# Patient Record
Sex: MALE | Race: WHITE | Employment: OTHER | URBAN - METROPOLITAN AREA
[De-identification: names, ages, dates, MRNs, and addresses within clinical notes are randomized per-mention and may not be internally consistent; named-entity substitution may affect disease eponyms.]

---

## 2017-01-01 ENCOUNTER — APPOINTMENT (OUTPATIENT)
Dept: GENERAL RADIOLOGY | Facility: CLINIC | Age: 82
DRG: 871 | End: 2017-01-01
Attending: NURSE PRACTITIONER
Payer: MEDICARE

## 2017-01-01 ENCOUNTER — RECORDS - HEALTHEAST (OUTPATIENT)
Dept: LAB | Facility: CLINIC | Age: 82
End: 2017-01-01

## 2017-01-01 ENCOUNTER — MEDICAL CORRESPONDENCE (OUTPATIENT)
Dept: INTENSIVE CARE | Facility: CLINIC | Age: 82
End: 2017-01-01

## 2017-01-01 ENCOUNTER — HOSPITAL ENCOUNTER (INPATIENT)
Facility: CLINIC | Age: 82
LOS: 6 days | Discharge: HOSPICE | DRG: 871 | End: 2017-08-04
Attending: NURSE PRACTITIONER | Admitting: HOSPITALIST
Payer: MEDICARE

## 2017-01-01 ENCOUNTER — APPOINTMENT (OUTPATIENT)
Dept: SPEECH THERAPY | Facility: CLINIC | Age: 82
DRG: 871 | End: 2017-01-01
Attending: HOSPITALIST
Payer: MEDICARE

## 2017-01-01 VITALS
TEMPERATURE: 97.9 F | SYSTOLIC BLOOD PRESSURE: 117 MMHG | OXYGEN SATURATION: 94 % | HEIGHT: 66 IN | WEIGHT: 164.24 LBS | RESPIRATION RATE: 16 BRPM | HEART RATE: 72 BPM | BODY MASS INDEX: 26.4 KG/M2 | DIASTOLIC BLOOD PRESSURE: 71 MMHG

## 2017-01-01 DIAGNOSIS — Z51.5 END OF LIFE CARE: Primary | ICD-10-CM

## 2017-01-01 DIAGNOSIS — D62 ANEMIA DUE TO BLOOD LOSS, ACUTE: ICD-10-CM

## 2017-01-01 DIAGNOSIS — J18.9 HCAP (HEALTHCARE-ASSOCIATED PNEUMONIA): ICD-10-CM

## 2017-01-01 DIAGNOSIS — R19.5 GUAIAC + STOOL: ICD-10-CM

## 2017-01-01 DIAGNOSIS — R09.02 HYPOXIA: ICD-10-CM

## 2017-01-01 LAB
ABO + RH BLD: NORMAL
ABO + RH BLD: NORMAL
ALBUMIN SERPL-MCNC: 3 G/DL (ref 3.4–5)
ALBUMIN SERPL-MCNC: 3.5 G/DL (ref 3.4–5)
ALBUMIN UR-MCNC: NEGATIVE MG/DL
ALP SERPL-CCNC: 45 U/L (ref 40–150)
ALP SERPL-CCNC: 59 U/L (ref 40–150)
ALT SERPL W P-5'-P-CCNC: 15 U/L (ref 0–70)
ALT SERPL W P-5'-P-CCNC: 15 U/L (ref 0–70)
ANION GAP SERPL CALCULATED.3IONS-SCNC: 11 MMOL/L (ref 3–14)
ANION GAP SERPL CALCULATED.3IONS-SCNC: 11 MMOL/L (ref 3–14)
ANION GAP SERPL CALCULATED.3IONS-SCNC: 5 MMOL/L (ref 3–14)
ANION GAP SERPL CALCULATED.3IONS-SCNC: 8 MMOL/L (ref 3–14)
ANISOCYTOSIS BLD QL SMEAR: SLIGHT
ANISOCYTOSIS BLD QL SMEAR: SLIGHT
APPEARANCE UR: CLEAR
AST SERPL W P-5'-P-CCNC: 16 U/L (ref 0–45)
AST SERPL W P-5'-P-CCNC: 20 U/L (ref 0–45)
BACTERIA SPEC CULT: NO GROWTH
BACTERIA SPEC CULT: NO GROWTH
BASOPHILS # BLD AUTO: 0 10E9/L (ref 0–0.2)
BASOPHILS # BLD AUTO: 0 10E9/L (ref 0–0.2)
BASOPHILS NFR BLD AUTO: 0 %
BASOPHILS NFR BLD AUTO: 0.3 %
BILIRUB DIRECT SERPL-MCNC: 0.2 MG/DL (ref 0–0.2)
BILIRUB SERPL-MCNC: 0.4 MG/DL (ref 0.2–1.3)
BILIRUB SERPL-MCNC: 0.6 MG/DL (ref 0.2–1.3)
BILIRUB UR QL STRIP: NEGATIVE
BLD GP AB SCN SERPL QL: NORMAL
BLD PROD TYP BPU: NORMAL
BLD UNIT ID BPU: 0
BLOOD BANK CMNT PATIENT-IMP: NORMAL
BLOOD PRODUCT CODE: NORMAL
BPU ID: NORMAL
BUN SERPL-MCNC: 16 MG/DL (ref 7–30)
BUN SERPL-MCNC: 17 MG/DL (ref 7–30)
BUN SERPL-MCNC: 20 MG/DL (ref 7–30)
BUN SERPL-MCNC: 21 MG/DL (ref 7–30)
CALCIUM SERPL-MCNC: 7.5 MG/DL (ref 8.5–10.1)
CALCIUM SERPL-MCNC: 7.6 MG/DL (ref 8.5–10.1)
CALCIUM SERPL-MCNC: 7.6 MG/DL (ref 8.5–10.1)
CALCIUM SERPL-MCNC: 8.6 MG/DL (ref 8.5–10.1)
CHLORIDE SERPL-SCNC: 103 MMOL/L (ref 94–109)
CHLORIDE SERPL-SCNC: 106 MMOL/L (ref 94–109)
CHLORIDE SERPL-SCNC: 108 MMOL/L (ref 94–109)
CHLORIDE SERPL-SCNC: 110 MMOL/L (ref 94–109)
CO2 SERPL-SCNC: 21 MMOL/L (ref 20–32)
CO2 SERPL-SCNC: 22 MMOL/L (ref 20–32)
CO2 SERPL-SCNC: 22 MMOL/L (ref 20–32)
CO2 SERPL-SCNC: 25 MMOL/L (ref 20–32)
COLOR UR AUTO: YELLOW
CREAT SERPL-MCNC: 0.58 MG/DL (ref 0.66–1.25)
CREAT SERPL-MCNC: 0.58 MG/DL (ref 0.66–1.25)
CREAT SERPL-MCNC: 0.67 MG/DL (ref 0.66–1.25)
CREAT SERPL-MCNC: 0.76 MG/DL (ref 0.66–1.25)
DACRYOCYTES BLD QL SMEAR: SLIGHT
DIFFERENTIAL METHOD BLD: ABNORMAL
DIFFERENTIAL METHOD BLD: ABNORMAL
EOSINOPHIL # BLD AUTO: 0 10E9/L (ref 0–0.7)
EOSINOPHIL # BLD AUTO: 0.1 10E9/L (ref 0–0.7)
EOSINOPHIL NFR BLD AUTO: 0 %
EOSINOPHIL NFR BLD AUTO: 0.6 %
ERYTHROCYTE [DISTWIDTH] IN BLOOD BY AUTOMATED COUNT: 17.4 % (ref 10–15)
ERYTHROCYTE [DISTWIDTH] IN BLOOD BY AUTOMATED COUNT: 17.4 % (ref 10–15)
ERYTHROCYTE [DISTWIDTH] IN BLOOD BY AUTOMATED COUNT: 17.5 % (ref 10–15)
ERYTHROCYTE [DISTWIDTH] IN BLOOD BY AUTOMATED COUNT: 17.9 % (ref 10–15)
ERYTHROCYTE [DISTWIDTH] IN BLOOD BY AUTOMATED COUNT: 18 % (ref 10–15)
ERYTHROCYTE [DISTWIDTH] IN BLOOD BY AUTOMATED COUNT: 18.3 % (ref 10–15)
GFR SERPL CREATININE-BSD FRML MDRD: ABNORMAL ML/MIN/1.7M2
GLUCOSE BLDC GLUCOMTR-MCNC: 100 MG/DL (ref 70–99)
GLUCOSE BLDC GLUCOMTR-MCNC: 100 MG/DL (ref 70–99)
GLUCOSE BLDC GLUCOMTR-MCNC: 105 MG/DL (ref 70–99)
GLUCOSE BLDC GLUCOMTR-MCNC: 136 MG/DL (ref 70–99)
GLUCOSE BLDC GLUCOMTR-MCNC: 137 MG/DL (ref 70–99)
GLUCOSE BLDC GLUCOMTR-MCNC: 155 MG/DL (ref 70–99)
GLUCOSE BLDC GLUCOMTR-MCNC: 161 MG/DL (ref 70–99)
GLUCOSE BLDC GLUCOMTR-MCNC: 162 MG/DL (ref 70–99)
GLUCOSE BLDC GLUCOMTR-MCNC: 65 MG/DL (ref 70–99)
GLUCOSE BLDC GLUCOMTR-MCNC: 68 MG/DL (ref 70–99)
GLUCOSE BLDC GLUCOMTR-MCNC: 70 MG/DL (ref 70–99)
GLUCOSE BLDC GLUCOMTR-MCNC: 72 MG/DL (ref 70–99)
GLUCOSE BLDC GLUCOMTR-MCNC: 74 MG/DL (ref 70–99)
GLUCOSE BLDC GLUCOMTR-MCNC: 74 MG/DL (ref 70–99)
GLUCOSE BLDC GLUCOMTR-MCNC: 75 MG/DL (ref 70–99)
GLUCOSE BLDC GLUCOMTR-MCNC: 76 MG/DL (ref 70–99)
GLUCOSE BLDC GLUCOMTR-MCNC: 77 MG/DL (ref 70–99)
GLUCOSE BLDC GLUCOMTR-MCNC: 80 MG/DL (ref 70–99)
GLUCOSE BLDC GLUCOMTR-MCNC: 81 MG/DL (ref 70–99)
GLUCOSE BLDC GLUCOMTR-MCNC: 83 MG/DL (ref 70–99)
GLUCOSE BLDC GLUCOMTR-MCNC: 83 MG/DL (ref 70–99)
GLUCOSE BLDC GLUCOMTR-MCNC: 88 MG/DL (ref 70–99)
GLUCOSE BLDC GLUCOMTR-MCNC: 89 MG/DL (ref 70–99)
GLUCOSE BLDC GLUCOMTR-MCNC: 90 MG/DL (ref 70–99)
GLUCOSE SERPL-MCNC: 157 MG/DL (ref 70–99)
GLUCOSE SERPL-MCNC: 176 MG/DL (ref 70–99)
GLUCOSE SERPL-MCNC: 83 MG/DL (ref 70–99)
GLUCOSE SERPL-MCNC: 85 MG/DL (ref 70–99)
GLUCOSE UR STRIP-MCNC: NEGATIVE MG/DL
HBA1C MFR BLD: 11.6 % (ref 4.2–6.1)
HBA1C MFR BLD: 5 % (ref 4.3–6)
HBA1C MFR BLD: 5.4 % (ref 4.2–6.1)
HCT VFR BLD AUTO: 22.5 % (ref 40–53)
HCT VFR BLD AUTO: 24.2 % (ref 40–53)
HCT VFR BLD AUTO: 25.5 % (ref 40–53)
HCT VFR BLD AUTO: 26.7 % (ref 40–53)
HCT VFR BLD AUTO: 26.8 % (ref 40–53)
HCT VFR BLD AUTO: 28 % (ref 40–53)
HEMOCCULT STL QL: POSITIVE
HGB BLD-MCNC: 6.2 G/DL (ref 13.3–17.7)
HGB BLD-MCNC: 6.7 G/DL (ref 13.3–17.7)
HGB BLD-MCNC: 6.9 G/DL (ref 13.3–17.7)
HGB BLD-MCNC: 7.9 G/DL (ref 13.3–17.7)
HGB BLD-MCNC: 8.1 G/DL (ref 13.3–17.7)
HGB BLD-MCNC: 8.1 G/DL (ref 13.3–17.7)
HGB BLD-MCNC: 8.2 G/DL (ref 13.3–17.7)
HGB BLD-MCNC: 8.7 G/DL (ref 13.3–17.7)
HGB UR QL STRIP: ABNORMAL
IMM GRANULOCYTES # BLD: 0.1 10E9/L (ref 0–0.4)
IMM GRANULOCYTES NFR BLD: 0.4 %
INR PPP: 1.3 (ref 0.86–1.14)
INTERPRETATION ECG - MUSE: NORMAL
KETONES UR STRIP-MCNC: 20 MG/DL
LACTATE BLD-SCNC: 1.8 MMOL/L (ref 0.7–2.1)
LACTATE BLD-SCNC: 2.9 MMOL/L (ref 0.7–2.1)
LEUKOCYTE ESTERASE UR QL STRIP: NEGATIVE
LYMPHOCYTES # BLD AUTO: 0.8 10E9/L (ref 0.8–5.3)
LYMPHOCYTES # BLD AUTO: 1.7 10E9/L (ref 0.8–5.3)
LYMPHOCYTES NFR BLD AUTO: 13.1 %
LYMPHOCYTES NFR BLD AUTO: 9 %
Lab: NORMAL
MAGNESIUM SERPL-MCNC: 1.9 MG/DL (ref 1.6–2.3)
MAGNESIUM SERPL-MCNC: 2.5 MG/DL (ref 1.6–2.3)
MCH RBC QN AUTO: 21.4 PG (ref 26.5–33)
MCH RBC QN AUTO: 24.4 PG (ref 26.5–33)
MCH RBC QN AUTO: 24.7 PG (ref 26.5–33)
MCH RBC QN AUTO: 24.8 PG (ref 26.5–33)
MCH RBC QN AUTO: 24.8 PG (ref 26.5–33)
MCH RBC QN AUTO: 25.3 PG (ref 26.5–33)
MCHC RBC AUTO-ENTMCNC: 27.7 G/DL (ref 31.5–36.5)
MCHC RBC AUTO-ENTMCNC: 30.2 G/DL (ref 31.5–36.5)
MCHC RBC AUTO-ENTMCNC: 30.3 G/DL (ref 31.5–36.5)
MCHC RBC AUTO-ENTMCNC: 30.7 G/DL (ref 31.5–36.5)
MCHC RBC AUTO-ENTMCNC: 31 G/DL (ref 31.5–36.5)
MCHC RBC AUTO-ENTMCNC: 31.1 G/DL (ref 31.5–36.5)
MCV RBC AUTO: 77 FL (ref 78–100)
MCV RBC AUTO: 80 FL (ref 78–100)
MCV RBC AUTO: 80 FL (ref 78–100)
MCV RBC AUTO: 82 FL (ref 78–100)
MICRO REPORT STATUS: NORMAL
MICRO REPORT STATUS: NORMAL
MICROCYTES BLD QL SMEAR: PRESENT
MONOCYTES # BLD AUTO: 0.1 10E9/L (ref 0–1.3)
MONOCYTES # BLD AUTO: 0.4 10E9/L (ref 0–1.3)
MONOCYTES NFR BLD AUTO: 1 %
MONOCYTES NFR BLD AUTO: 3.4 %
MRSA DNA SPEC QL NAA+PROBE: NORMAL
MUCOUS THREADS #/AREA URNS LPF: PRESENT /LPF
NEUTROPHILS # BLD AUTO: 10.5 10E9/L (ref 1.6–8.3)
NEUTROPHILS # BLD AUTO: 7.7 10E9/L (ref 1.6–8.3)
NEUTROPHILS NFR BLD AUTO: 82.2 %
NEUTROPHILS NFR BLD AUTO: 90 %
NITRATE UR QL: NEGATIVE
NRBC # BLD AUTO: 0 10*3/UL
NRBC BLD AUTO-RTO: 0 /100
NUM BPU REQUESTED: 3
OVALOCYTES BLD QL SMEAR: SLIGHT
PH UR STRIP: 5 PH (ref 5–7)
PLATELET # BLD AUTO: 248 10E9/L (ref 150–450)
PLATELET # BLD AUTO: 254 10E9/L (ref 150–450)
PLATELET # BLD AUTO: 258 10E9/L (ref 150–450)
PLATELET # BLD AUTO: 259 10E9/L (ref 150–450)
PLATELET # BLD AUTO: 326 10E9/L (ref 150–450)
PLATELET # BLD AUTO: 548 10E9/L (ref 150–450)
PLATELET # BLD EST: ABNORMAL 10*3/UL
PLATELET # BLD EST: NORMAL 10*3/UL
POTASSIUM SERPL-SCNC: 3.6 MMOL/L (ref 3.4–5.3)
POTASSIUM SERPL-SCNC: 3.7 MMOL/L (ref 3.4–5.3)
POTASSIUM SERPL-SCNC: 3.8 MMOL/L (ref 3.4–5.3)
POTASSIUM SERPL-SCNC: 4.4 MMOL/L (ref 3.4–5.3)
PROT SERPL-MCNC: 6.6 G/DL (ref 6.8–8.8)
PROT SERPL-MCNC: 7.6 G/DL (ref 6.8–8.8)
RBC # BLD AUTO: 2.83 10E12/L (ref 4.4–5.9)
RBC # BLD AUTO: 3.12 10E12/L (ref 4.4–5.9)
RBC # BLD AUTO: 3.13 10E12/L (ref 4.4–5.9)
RBC # BLD AUTO: 3.26 10E12/L (ref 4.4–5.9)
RBC # BLD AUTO: 3.27 10E12/L (ref 4.4–5.9)
RBC # BLD AUTO: 3.52 10E12/L (ref 4.4–5.9)
RBC #/AREA URNS AUTO: 18 /HPF (ref 0–2)
SODIUM SERPL-SCNC: 135 MMOL/L (ref 133–144)
SODIUM SERPL-SCNC: 138 MMOL/L (ref 133–144)
SODIUM SERPL-SCNC: 139 MMOL/L (ref 133–144)
SODIUM SERPL-SCNC: 140 MMOL/L (ref 133–144)
SP GR UR STRIP: 1.01 (ref 1–1.03)
SPECIMEN EXP DATE BLD: NORMAL
SPECIMEN SOURCE: NORMAL
TRANSFUSION STATUS PATIENT QL: NORMAL
TROPONIN I SERPL-MCNC: 0.01 UG/L (ref 0–0.04)
URN SPEC COLLECT METH UR: ABNORMAL
UROBILINOGEN UR STRIP-MCNC: 0 MG/DL (ref 0–2)
WBC # BLD AUTO: 12.8 10E9/L (ref 4–11)
WBC # BLD AUTO: 7.1 10E9/L (ref 4–11)
WBC # BLD AUTO: 7.2 10E9/L (ref 4–11)
WBC # BLD AUTO: 7.5 10E9/L (ref 4–11)
WBC # BLD AUTO: 7.7 10E9/L (ref 4–11)
WBC # BLD AUTO: 8.6 10E9/L (ref 4–11)
WBC #/AREA URNS AUTO: <1 /HPF (ref 0–2)

## 2017-01-01 PROCEDURE — 86850 RBC ANTIBODY SCREEN: CPT | Performed by: NURSE PRACTITIONER

## 2017-01-01 PROCEDURE — 25000128 H RX IP 250 OP 636: Performed by: NURSE PRACTITIONER

## 2017-01-01 PROCEDURE — 12000011 ZZH R&B MS OVERFLOW

## 2017-01-01 PROCEDURE — 83735 ASSAY OF MAGNESIUM: CPT | Performed by: INTERNAL MEDICINE

## 2017-01-01 PROCEDURE — 00000146 ZZHCL STATISTIC GLUCOSE BY METER IP

## 2017-01-01 PROCEDURE — 25000128 H RX IP 250 OP 636: Performed by: HOSPITALIST

## 2017-01-01 PROCEDURE — 86900 BLOOD TYPING SEROLOGIC ABO: CPT | Performed by: NURSE PRACTITIONER

## 2017-01-01 PROCEDURE — 82272 OCCULT BLD FECES 1-3 TESTS: CPT | Performed by: NURSE PRACTITIONER

## 2017-01-01 PROCEDURE — 40000275 ZZH STATISTIC RCP TIME EA 10 MIN

## 2017-01-01 PROCEDURE — 25000128 H RX IP 250 OP 636: Performed by: INTERNAL MEDICINE

## 2017-01-01 PROCEDURE — 25800025 ZZH RX 258: Performed by: INTERNAL MEDICINE

## 2017-01-01 PROCEDURE — 99232 SBSQ HOSP IP/OBS MODERATE 35: CPT | Performed by: INTERNAL MEDICINE

## 2017-01-01 PROCEDURE — A9270 NON-COVERED ITEM OR SERVICE: HCPCS | Mod: GY | Performed by: CLINICAL NURSE SPECIALIST

## 2017-01-01 PROCEDURE — 25000125 ZZHC RX 250: Performed by: HOSPITALIST

## 2017-01-01 PROCEDURE — 31720 CLEARANCE OF AIRWAYS: CPT

## 2017-01-01 PROCEDURE — 27210300 ZZH CANNULA HIGH FLOW, ADULT

## 2017-01-01 PROCEDURE — 40000983 ZZH STATISTIC HFNC ADULT NON-CPAP

## 2017-01-01 PROCEDURE — 25000125 ZZHC RX 250: Performed by: INTERNAL MEDICINE

## 2017-01-01 PROCEDURE — 25000125 ZZHC RX 250: Performed by: CLINICAL NURSE SPECIALIST

## 2017-01-01 PROCEDURE — 36415 COLL VENOUS BLD VENIPUNCTURE: CPT | Performed by: INTERNAL MEDICINE

## 2017-01-01 PROCEDURE — 83605 ASSAY OF LACTIC ACID: CPT | Performed by: HOSPITALIST

## 2017-01-01 PROCEDURE — 85025 COMPLETE CBC W/AUTO DIFF WBC: CPT | Performed by: NURSE PRACTITIONER

## 2017-01-01 PROCEDURE — S0077 INJECTION, CLINDAMYCIN PHOSP: HCPCS | Performed by: HOSPITALIST

## 2017-01-01 PROCEDURE — 99233 SBSQ HOSP IP/OBS HIGH 50: CPT | Performed by: INTERNAL MEDICINE

## 2017-01-01 PROCEDURE — 81001 URINALYSIS AUTO W/SCOPE: CPT | Performed by: NURSE PRACTITIONER

## 2017-01-01 PROCEDURE — 99207 ZZC CDG-MDM COMPONENT: MEETS LOW - DOWN CODED: CPT | Performed by: INTERNAL MEDICINE

## 2017-01-01 PROCEDURE — 85018 HEMOGLOBIN: CPT | Performed by: NURSE PRACTITIONER

## 2017-01-01 PROCEDURE — 25000128 H RX IP 250 OP 636: Performed by: CLINICAL NURSE SPECIALIST

## 2017-01-01 PROCEDURE — 99239 HOSP IP/OBS DSCHRG MGMT >30: CPT | Performed by: HOSPITALIST

## 2017-01-01 PROCEDURE — 25000132 ZZH RX MED GY IP 250 OP 250 PS 637: Mod: GY | Performed by: CLINICAL NURSE SPECIALIST

## 2017-01-01 PROCEDURE — 85027 COMPLETE CBC AUTOMATED: CPT | Performed by: INTERNAL MEDICINE

## 2017-01-01 PROCEDURE — 25000131 ZZH RX MED GY IP 250 OP 636 PS 637: Mod: GY | Performed by: INTERNAL MEDICINE

## 2017-01-01 PROCEDURE — 80053 COMPREHEN METABOLIC PANEL: CPT | Performed by: NURSE PRACTITIONER

## 2017-01-01 PROCEDURE — 99232 SBSQ HOSP IP/OBS MODERATE 35: CPT | Performed by: HOSPITALIST

## 2017-01-01 PROCEDURE — 25000125 ZZHC RX 250: Performed by: NURSE PRACTITIONER

## 2017-01-01 PROCEDURE — 93005 ELECTROCARDIOGRAM TRACING: CPT

## 2017-01-01 PROCEDURE — 80048 BASIC METABOLIC PNL TOTAL CA: CPT | Performed by: INTERNAL MEDICINE

## 2017-01-01 PROCEDURE — 36415 COLL VENOUS BLD VENIPUNCTURE: CPT | Performed by: NURSE PRACTITIONER

## 2017-01-01 PROCEDURE — 71020 XR CHEST 2 VW: CPT

## 2017-01-01 PROCEDURE — P9016 RBC LEUKOCYTES REDUCED: HCPCS | Performed by: NURSE PRACTITIONER

## 2017-01-01 PROCEDURE — 99285 EMERGENCY DEPT VISIT HI MDM: CPT | Mod: 25

## 2017-01-01 PROCEDURE — 94660 CPAP INITIATION&MGMT: CPT

## 2017-01-01 PROCEDURE — 92610 EVALUATE SWALLOWING FUNCTION: CPT | Mod: GN | Performed by: SPEECH-LANGUAGE PATHOLOGIST

## 2017-01-01 PROCEDURE — 85018 HEMOGLOBIN: CPT | Performed by: INTERNAL MEDICINE

## 2017-01-01 PROCEDURE — 99223 1ST HOSP IP/OBS HIGH 75: CPT | Mod: AI | Performed by: HOSPITALIST

## 2017-01-01 PROCEDURE — 92507 TX SP LANG VOICE COMM INDIV: CPT | Mod: GN | Performed by: SPEECH-LANGUAGE PATHOLOGIST

## 2017-01-01 PROCEDURE — 86923 COMPATIBILITY TEST ELECTRIC: CPT | Performed by: NURSE PRACTITIONER

## 2017-01-01 PROCEDURE — 87040 BLOOD CULTURE FOR BACTERIA: CPT | Performed by: INTERNAL MEDICINE

## 2017-01-01 PROCEDURE — 20000003 ZZH R&B ICU

## 2017-01-01 PROCEDURE — 99233 SBSQ HOSP IP/OBS HIGH 50: CPT | Performed by: CLINICAL NURSE SPECIALIST

## 2017-01-01 PROCEDURE — 85610 PROTHROMBIN TIME: CPT | Performed by: INTERNAL MEDICINE

## 2017-01-01 PROCEDURE — 84484 ASSAY OF TROPONIN QUANT: CPT | Performed by: NURSE PRACTITIONER

## 2017-01-01 PROCEDURE — 99223 1ST HOSP IP/OBS HIGH 75: CPT | Performed by: CLINICAL NURSE SPECIALIST

## 2017-01-01 PROCEDURE — 36415 COLL VENOUS BLD VENIPUNCTURE: CPT | Performed by: HOSPITALIST

## 2017-01-01 PROCEDURE — 96361 HYDRATE IV INFUSION ADD-ON: CPT

## 2017-01-01 PROCEDURE — 99207 ZZC CDG-MDM COMPONENT: MEETS LOW - DOWN CODED: CPT | Performed by: HOSPITALIST

## 2017-01-01 PROCEDURE — 85025 COMPLETE CBC W/AUTO DIFF WBC: CPT | Performed by: HOSPITALIST

## 2017-01-01 PROCEDURE — 40000281 ZZH STATISTIC TRANSPORT TIME EA 15 MIN

## 2017-01-01 PROCEDURE — 80076 HEPATIC FUNCTION PANEL: CPT | Performed by: HOSPITALIST

## 2017-01-01 PROCEDURE — 40000225 ZZH STATISTIC SLP WARD VISIT: Performed by: SPEECH-LANGUAGE PATHOLOGIST

## 2017-01-01 PROCEDURE — 80048 BASIC METABOLIC PNL TOTAL CA: CPT | Performed by: HOSPITALIST

## 2017-01-01 PROCEDURE — 96365 THER/PROPH/DIAG IV INF INIT: CPT

## 2017-01-01 PROCEDURE — 25000132 ZZH RX MED GY IP 250 OP 250 PS 637: Mod: GY | Performed by: INTERNAL MEDICINE

## 2017-01-01 PROCEDURE — 96366 THER/PROPH/DIAG IV INF ADDON: CPT

## 2017-01-01 PROCEDURE — 83036 HEMOGLOBIN GLYCOSYLATED A1C: CPT | Performed by: HOSPITALIST

## 2017-01-01 PROCEDURE — 83605 ASSAY OF LACTIC ACID: CPT | Performed by: NURSE PRACTITIONER

## 2017-01-01 PROCEDURE — 86901 BLOOD TYPING SEROLOGIC RH(D): CPT | Performed by: NURSE PRACTITIONER

## 2017-01-01 PROCEDURE — 87641 MR-STAPH DNA AMP PROBE: CPT | Performed by: INTERNAL MEDICINE

## 2017-01-01 PROCEDURE — 83735 ASSAY OF MAGNESIUM: CPT | Performed by: HOSPITALIST

## 2017-01-01 PROCEDURE — 87640 STAPH A DNA AMP PROBE: CPT | Performed by: INTERNAL MEDICINE

## 2017-01-01 RX ORDER — DIVALPROEX SODIUM 250 MG/1
250 TABLET, EXTENDED RELEASE ORAL AT BEDTIME
Status: ON HOLD | COMMUNITY
End: 2017-01-01

## 2017-01-01 RX ORDER — POTASSIUM CHLORIDE 1500 MG/1
20-40 TABLET, EXTENDED RELEASE ORAL
Status: DISCONTINUED | OUTPATIENT
Start: 2017-01-01 | End: 2017-01-01

## 2017-01-01 RX ORDER — POTASSIUM CHLORIDE 1.5 G/1.58G
20-40 POWDER, FOR SOLUTION ORAL
Status: DISCONTINUED | OUTPATIENT
Start: 2017-01-01 | End: 2017-01-01

## 2017-01-01 RX ORDER — MORPHINE SULFATE 20 MG/ML
5-10 SOLUTION ORAL
Qty: 15 ML | Refills: 0 | Status: SHIPPED | OUTPATIENT
Start: 2017-01-01

## 2017-01-01 RX ORDER — MORPHINE SULFATE 2 MG/ML
1-2 INJECTION, SOLUTION INTRAMUSCULAR; INTRAVENOUS
Status: DISCONTINUED | OUTPATIENT
Start: 2017-01-01 | End: 2017-01-01

## 2017-01-01 RX ORDER — LORAZEPAM 2 MG/ML
.5-1 INJECTION INTRAMUSCULAR
Status: DISCONTINUED | OUTPATIENT
Start: 2017-01-01 | End: 2017-01-01 | Stop reason: HOSPADM

## 2017-01-01 RX ORDER — HALOPERIDOL 2 MG/ML
.5-1 SOLUTION ORAL EVERY 6 HOURS PRN
Qty: 15 ML | Refills: 0 | Status: SHIPPED | OUTPATIENT
Start: 2017-01-01

## 2017-01-01 RX ORDER — SODIUM CHLORIDE 9 MG/ML
INJECTION, SOLUTION INTRAVENOUS CONTINUOUS
Status: DISCONTINUED | OUTPATIENT
Start: 2017-01-01 | End: 2017-01-01

## 2017-01-01 RX ORDER — PANTOPRAZOLE SODIUM 40 MG/1
40 TABLET, DELAYED RELEASE ORAL DAILY
Status: DISCONTINUED | OUTPATIENT
Start: 2017-01-01 | End: 2017-01-01

## 2017-01-01 RX ORDER — POTASSIUM CHLORIDE 7.45 MG/ML
10 INJECTION INTRAVENOUS
Status: DISCONTINUED | OUTPATIENT
Start: 2017-01-01 | End: 2017-01-01

## 2017-01-01 RX ORDER — POTASSIUM CHLORIDE 29.8 MG/ML
20 INJECTION INTRAVENOUS
Status: DISCONTINUED | OUTPATIENT
Start: 2017-01-01 | End: 2017-01-01

## 2017-01-01 RX ORDER — ASPIRIN 81 MG/1
81 TABLET, CHEWABLE ORAL DAILY
Status: DISCONTINUED | OUTPATIENT
Start: 2017-01-01 | End: 2017-01-01

## 2017-01-01 RX ORDER — MIRTAZAPINE 15 MG/1
15 TABLET, ORALLY DISINTEGRATING ORAL AT BEDTIME
Status: DISCONTINUED | OUTPATIENT
Start: 2017-01-01 | End: 2017-01-01

## 2017-01-01 RX ORDER — BISACODYL 10 MG
10 SUPPOSITORY, RECTAL RECTAL DAILY PRN
Status: DISCONTINUED | OUTPATIENT
Start: 2017-01-01 | End: 2017-01-01 | Stop reason: HOSPADM

## 2017-01-01 RX ORDER — LORAZEPAM 0.5 MG/1
.5-1 TABLET ORAL
Status: DISCONTINUED | OUTPATIENT
Start: 2017-01-01 | End: 2017-01-01 | Stop reason: HOSPADM

## 2017-01-01 RX ORDER — SERTRALINE HYDROCHLORIDE 25 MG/1
25 TABLET, FILM COATED ORAL DAILY
Status: DISCONTINUED | OUTPATIENT
Start: 2017-01-01 | End: 2017-01-01

## 2017-01-01 RX ORDER — LORAZEPAM 0.5 MG/1
.5-1 TABLET ORAL
Status: DISCONTINUED | OUTPATIENT
Start: 2017-01-01 | End: 2017-01-01

## 2017-01-01 RX ORDER — SCOLOPAMINE TRANSDERMAL SYSTEM 1 MG/1
1 PATCH, EXTENDED RELEASE TRANSDERMAL
Status: DISCONTINUED | OUTPATIENT
Start: 2017-01-01 | End: 2017-01-01

## 2017-01-01 RX ORDER — MORPHINE SULFATE 10 MG/5ML
5-10 SOLUTION ORAL
Status: DISCONTINUED | OUTPATIENT
Start: 2017-01-01 | End: 2017-01-01

## 2017-01-01 RX ORDER — TIMOLOL MALEATE 2.5 MG/ML
1 SOLUTION/ DROPS OPHTHALMIC EVERY MORNING
Status: DISCONTINUED | OUTPATIENT
Start: 2017-01-01 | End: 2017-01-01 | Stop reason: CLARIF

## 2017-01-01 RX ORDER — METHOCARBAMOL 750 MG/1
750 TABLET, FILM COATED ORAL 3 TIMES DAILY
Status: DISCONTINUED | OUTPATIENT
Start: 2017-01-01 | End: 2017-01-01

## 2017-01-01 RX ORDER — LORAZEPAM 2 MG/ML
.25-.5 CONCENTRATE ORAL EVERY 4 HOURS PRN
Qty: 30 ML | Refills: 0 | Status: SHIPPED | OUTPATIENT
Start: 2017-01-01

## 2017-01-01 RX ORDER — POLYETHYLENE GLYCOL 3350 17 G/17G
17 POWDER, FOR SOLUTION ORAL DAILY PRN
Status: DISCONTINUED | OUTPATIENT
Start: 2017-01-01 | End: 2017-01-01 | Stop reason: CLARIF

## 2017-01-01 RX ORDER — AMOXICILLIN 250 MG
1-2 CAPSULE ORAL 2 TIMES DAILY
Status: DISCONTINUED | OUTPATIENT
Start: 2017-01-01 | End: 2017-01-01

## 2017-01-01 RX ORDER — TIMOLOL MALEATE 5 MG/ML
1 SOLUTION/ DROPS OPHTHALMIC 2 TIMES DAILY
COMMUNITY

## 2017-01-01 RX ORDER — ACETAMINOPHEN 500 MG
1000 TABLET ORAL 3 TIMES DAILY
Status: DISCONTINUED | OUTPATIENT
Start: 2017-01-01 | End: 2017-01-01

## 2017-01-01 RX ORDER — NICOTINE POLACRILEX 4 MG
15-30 LOZENGE BUCCAL
Status: DISCONTINUED | OUTPATIENT
Start: 2017-01-01 | End: 2017-01-01 | Stop reason: HOSPADM

## 2017-01-01 RX ORDER — GLYCOPYRROLATE 0.2 MG/ML
.2-.4 INJECTION, SOLUTION INTRAMUSCULAR; INTRAVENOUS EVERY 4 HOURS PRN
Status: DISCONTINUED | OUTPATIENT
Start: 2017-01-01 | End: 2017-01-01

## 2017-01-01 RX ORDER — ONDANSETRON 2 MG/ML
4 INJECTION INTRAMUSCULAR; INTRAVENOUS EVERY 6 HOURS PRN
Status: DISCONTINUED | OUTPATIENT
Start: 2017-01-01 | End: 2017-01-01

## 2017-01-01 RX ORDER — NALOXONE HYDROCHLORIDE 0.4 MG/ML
.1-.4 INJECTION, SOLUTION INTRAMUSCULAR; INTRAVENOUS; SUBCUTANEOUS
Status: DISCONTINUED | OUTPATIENT
Start: 2017-01-01 | End: 2017-01-01 | Stop reason: HOSPADM

## 2017-01-01 RX ORDER — LATANOPROST 50 UG/ML
1 SOLUTION/ DROPS OPHTHALMIC AT BEDTIME
COMMUNITY

## 2017-01-01 RX ORDER — QUETIAPINE FUMARATE 25 MG/1
25 TABLET, FILM COATED ORAL 2 TIMES DAILY PRN
Status: DISCONTINUED | OUTPATIENT
Start: 2017-01-01 | End: 2017-01-01 | Stop reason: CLARIF

## 2017-01-01 RX ORDER — DEXTROSE MONOHYDRATE 25 G/50ML
25-50 INJECTION, SOLUTION INTRAVENOUS
Status: DISCONTINUED | OUTPATIENT
Start: 2017-01-01 | End: 2017-01-01 | Stop reason: HOSPADM

## 2017-01-01 RX ORDER — CLINDAMYCIN PHOSPHATE 900 MG/50ML
900 INJECTION, SOLUTION INTRAVENOUS EVERY 8 HOURS
Status: DISCONTINUED | OUTPATIENT
Start: 2017-01-01 | End: 2017-01-01

## 2017-01-01 RX ORDER — ONDANSETRON 4 MG/1
4 TABLET, ORALLY DISINTEGRATING ORAL EVERY 6 HOURS PRN
Qty: 5 TABLET | Refills: 0 | Status: SHIPPED | OUTPATIENT
Start: 2017-01-01

## 2017-01-01 RX ORDER — ACETAMINOPHEN 650 MG/1
650 SUPPOSITORY RECTAL EVERY 4 HOURS PRN
Status: DISCONTINUED | OUTPATIENT
Start: 2017-01-01 | End: 2017-01-01 | Stop reason: HOSPADM

## 2017-01-01 RX ORDER — ONDANSETRON 4 MG/1
4 TABLET, ORALLY DISINTEGRATING ORAL EVERY 6 HOURS PRN
Status: DISCONTINUED | OUTPATIENT
Start: 2017-01-01 | End: 2017-01-01

## 2017-01-01 RX ORDER — ONDANSETRON 4 MG/1
4 TABLET, ORALLY DISINTEGRATING ORAL EVERY 6 HOURS PRN
Status: DISCONTINUED | OUTPATIENT
Start: 2017-01-01 | End: 2017-01-01 | Stop reason: HOSPADM

## 2017-01-01 RX ORDER — SODIUM CHLORIDE 9 MG/ML
1000 INJECTION, SOLUTION INTRAVENOUS CONTINUOUS
Status: DISCONTINUED | OUTPATIENT
Start: 2017-01-01 | End: 2017-01-01

## 2017-01-01 RX ORDER — LATANOPROST 50 UG/ML
1 SOLUTION/ DROPS OPHTHALMIC AT BEDTIME
Status: DISCONTINUED | OUTPATIENT
Start: 2017-01-01 | End: 2017-01-01 | Stop reason: HOSPADM

## 2017-01-01 RX ORDER — DEXTROSE MONOHYDRATE, SODIUM CHLORIDE, AND POTASSIUM CHLORIDE 50; 1.49; 4.5 G/1000ML; G/1000ML; G/1000ML
INJECTION, SOLUTION INTRAVENOUS CONTINUOUS
Status: DISCONTINUED | OUTPATIENT
Start: 2017-01-01 | End: 2017-01-01

## 2017-01-01 RX ORDER — BISACODYL 10 MG
10 SUPPOSITORY, RECTAL RECTAL DAILY PRN
Status: DISCONTINUED | OUTPATIENT
Start: 2017-01-01 | End: 2017-01-01

## 2017-01-01 RX ORDER — BISACODYL 10 MG
10 SUPPOSITORY, RECTAL RECTAL
Status: DISCONTINUED | OUTPATIENT
Start: 2017-01-01 | End: 2017-01-01 | Stop reason: HOSPADM

## 2017-01-01 RX ORDER — MORPHINE SULFATE 20 MG/ML
5-10 SOLUTION ORAL
Status: DISCONTINUED | OUTPATIENT
Start: 2017-01-01 | End: 2017-01-01 | Stop reason: HOSPADM

## 2017-01-01 RX ORDER — ATROPINE SULFATE 10 MG/ML
2-4 SOLUTION/ DROPS OPHTHALMIC
Qty: 5 ML | Refills: 0 | Status: SHIPPED | OUTPATIENT
Start: 2017-01-01

## 2017-01-01 RX ORDER — DONEPEZIL HYDROCHLORIDE 10 MG/1
10 TABLET, FILM COATED ORAL AT BEDTIME
Status: DISCONTINUED | OUTPATIENT
Start: 2017-01-01 | End: 2017-01-01

## 2017-01-01 RX ORDER — TIMOLOL MALEATE 5 MG/ML
1 SOLUTION/ DROPS OPHTHALMIC 2 TIMES DAILY
Status: DISCONTINUED | OUTPATIENT
Start: 2017-01-01 | End: 2017-01-01 | Stop reason: HOSPADM

## 2017-01-01 RX ORDER — ATROPINE SULFATE 10 MG/ML
1-2 SOLUTION/ DROPS OPHTHALMIC
Status: DISCONTINUED | OUTPATIENT
Start: 2017-01-01 | End: 2017-01-01 | Stop reason: HOSPADM

## 2017-01-01 RX ORDER — NALOXONE HYDROCHLORIDE 0.4 MG/ML
.1-.4 INJECTION, SOLUTION INTRAMUSCULAR; INTRAVENOUS; SUBCUTANEOUS
Status: DISCONTINUED | OUTPATIENT
Start: 2017-01-01 | End: 2017-01-01

## 2017-01-01 RX ORDER — ONDANSETRON 2 MG/ML
4 INJECTION INTRAMUSCULAR; INTRAVENOUS EVERY 6 HOURS PRN
Status: DISCONTINUED | OUTPATIENT
Start: 2017-01-01 | End: 2017-01-01 | Stop reason: HOSPADM

## 2017-01-01 RX ORDER — POTASSIUM CL/LIDO/0.9 % NACL 10MEQ/0.1L
10 INTRAVENOUS SOLUTION, PIGGYBACK (ML) INTRAVENOUS
Status: DISCONTINUED | OUTPATIENT
Start: 2017-01-01 | End: 2017-01-01

## 2017-01-01 RX ORDER — MINERAL OIL/HYDROPHIL PETROLAT
OINTMENT (GRAM) TOPICAL EVERY 8 HOURS PRN
Status: DISCONTINUED | OUTPATIENT
Start: 2017-01-01 | End: 2017-01-01 | Stop reason: HOSPADM

## 2017-01-01 RX ORDER — OXYCODONE HYDROCHLORIDE 5 MG/1
5 TABLET ORAL EVERY 4 HOURS PRN
Status: DISCONTINUED | OUTPATIENT
Start: 2017-01-01 | End: 2017-01-01 | Stop reason: CLARIF

## 2017-01-01 RX ORDER — MINERAL OIL/HYDROPHIL PETROLAT
OINTMENT (GRAM) TOPICAL EVERY 8 HOURS PRN
Qty: 50 G | Refills: 0 | Status: SHIPPED | OUTPATIENT
Start: 2017-01-01

## 2017-01-01 RX ORDER — ACETAMINOPHEN 650 MG/1
650 SUPPOSITORY RECTAL EVERY 4 HOURS PRN
Qty: 12 SUPPOSITORY | Refills: 0 | Status: SHIPPED | OUTPATIENT
Start: 2017-01-01

## 2017-01-01 RX ORDER — LEVOFLOXACIN 5 MG/ML
500 INJECTION, SOLUTION INTRAVENOUS ONCE
Status: COMPLETED | OUTPATIENT
Start: 2017-01-01 | End: 2017-01-01

## 2017-01-01 RX ORDER — DIVALPROEX SODIUM 250 MG/1
250 TABLET, EXTENDED RELEASE ORAL
Status: DISCONTINUED | OUTPATIENT
Start: 2017-01-01 | End: 2017-01-01

## 2017-01-01 RX ORDER — MAGNESIUM SULFATE HEPTAHYDRATE 40 MG/ML
4 INJECTION, SOLUTION INTRAVENOUS EVERY 4 HOURS PRN
Status: DISCONTINUED | OUTPATIENT
Start: 2017-01-01 | End: 2017-01-01 | Stop reason: HOSPADM

## 2017-01-01 RX ORDER — LEVOFLOXACIN 5 MG/ML
500 INJECTION, SOLUTION INTRAVENOUS EVERY 24 HOURS
Status: DISCONTINUED | OUTPATIENT
Start: 2017-01-01 | End: 2017-01-01

## 2017-01-01 RX ADMIN — TIMOLOL MALEATE 1 DROP: 5 SOLUTION/ DROPS OPHTHALMIC at 09:00

## 2017-01-01 RX ADMIN — DICLOFENAC SODIUM 2 G: 10 GEL TOPICAL at 08:38

## 2017-01-01 RX ADMIN — TIMOLOL MALEATE 1 DROP: 5 SOLUTION/ DROPS OPHTHALMIC at 08:38

## 2017-01-01 RX ADMIN — DICLOFENAC SODIUM 2 G: 10 GEL TOPICAL at 21:48

## 2017-01-01 RX ADMIN — ATROPINE SULFATE 2 DROP: 10 SOLUTION/ DROPS OPHTHALMIC at 14:01

## 2017-01-01 RX ADMIN — DEXTROSE MONOHYDRATE 25 ML: 25 INJECTION, SOLUTION INTRAVENOUS at 02:32

## 2017-01-01 RX ADMIN — SCOPOLAMINE 1 PATCH: 1 PATCH, EXTENDED RELEASE TRANSDERMAL at 10:56

## 2017-01-01 RX ADMIN — Medication 2.5 MG: at 22:09

## 2017-01-01 RX ADMIN — MORPHINE SULFATE 2 MG: 2 INJECTION, SOLUTION INTRAMUSCULAR; INTRAVENOUS at 14:41

## 2017-01-01 RX ADMIN — MORPHINE SULFATE 2 MG: 2 INJECTION, SOLUTION INTRAMUSCULAR; INTRAVENOUS at 04:11

## 2017-01-01 RX ADMIN — PANTOPRAZOLE SODIUM 40 MG: 40 INJECTION, POWDER, FOR SOLUTION INTRAVENOUS at 20:30

## 2017-01-01 RX ADMIN — CLINDAMYCIN PHOSPHATE 900 MG: 18 INJECTION, SOLUTION INTRAMUSCULAR; INTRAVENOUS at 21:57

## 2017-01-01 RX ADMIN — PANTOPRAZOLE SODIUM 40 MG: 40 INJECTION, POWDER, FOR SOLUTION INTRAVENOUS at 07:40

## 2017-01-01 RX ADMIN — MORPHINE SULFATE 2 MG: 2 INJECTION, SOLUTION INTRAMUSCULAR; INTRAVENOUS at 13:07

## 2017-01-01 RX ADMIN — VALPROATE SODIUM 250 MG: 100 INJECTION, SOLUTION INTRAVENOUS at 22:02

## 2017-01-01 RX ADMIN — DICLOFENAC SODIUM 2 G: 10 GEL TOPICAL at 17:51

## 2017-01-01 RX ADMIN — LATANOPROST 1 DROP: 50 SOLUTION/ DROPS OPHTHALMIC at 21:47

## 2017-01-01 RX ADMIN — SODIUM CHLORIDE 1000 ML: 9 INJECTION, SOLUTION INTRAVENOUS at 23:30

## 2017-01-01 RX ADMIN — VALPROATE SODIUM 250 MG: 100 INJECTION, SOLUTION INTRAVENOUS at 21:56

## 2017-01-01 RX ADMIN — SODIUM CHLORIDE, PRESERVATIVE FREE: 5 INJECTION INTRAVENOUS at 09:15

## 2017-01-01 RX ADMIN — INSULIN ASPART 1 UNITS: 100 INJECTION, SOLUTION INTRAVENOUS; SUBCUTANEOUS at 12:43

## 2017-01-01 RX ADMIN — DICLOFENAC SODIUM 2 G: 10 GEL TOPICAL at 07:40

## 2017-01-01 RX ADMIN — VALPROATE SODIUM 250 MG: 100 INJECTION, SOLUTION INTRAVENOUS at 22:08

## 2017-01-01 RX ADMIN — ATROPINE SULFATE 2 DROP: 10 SOLUTION/ DROPS OPHTHALMIC at 10:09

## 2017-01-01 RX ADMIN — DICLOFENAC SODIUM 2 G: 10 GEL TOPICAL at 17:15

## 2017-01-01 RX ADMIN — TIMOLOL MALEATE 1 DROP: 5 SOLUTION/ DROPS OPHTHALMIC at 21:06

## 2017-01-01 RX ADMIN — CLINDAMYCIN PHOSPHATE 900 MG: 18 INJECTION, SOLUTION INTRAMUSCULAR; INTRAVENOUS at 13:32

## 2017-01-01 RX ADMIN — PANTOPRAZOLE SODIUM 40 MG: 40 INJECTION, POWDER, FOR SOLUTION INTRAVENOUS at 08:31

## 2017-01-01 RX ADMIN — SODIUM CHLORIDE 1000 ML: 9 INJECTION, SOLUTION INTRAVENOUS at 02:10

## 2017-01-01 RX ADMIN — Medication 2 G: at 13:23

## 2017-01-01 RX ADMIN — SODIUM CHLORIDE, PRESERVATIVE FREE: 5 INJECTION INTRAVENOUS at 18:12

## 2017-01-01 RX ADMIN — GLYCOPYRROLATE 0.4 MG: 0.2 INJECTION, SOLUTION INTRAMUSCULAR; INTRAVENOUS at 04:58

## 2017-01-01 RX ADMIN — MORPHINE SULFATE 2 MG: 2 INJECTION, SOLUTION INTRAMUSCULAR; INTRAVENOUS at 17:55

## 2017-01-01 RX ADMIN — LEVOFLOXACIN 500 MG: 5 INJECTION, SOLUTION INTRAVENOUS at 23:48

## 2017-01-01 RX ADMIN — TIMOLOL MALEATE 1 DROP: 5 SOLUTION/ DROPS OPHTHALMIC at 20:57

## 2017-01-01 RX ADMIN — LEVOFLOXACIN 500 MG: 5 INJECTION, SOLUTION INTRAVENOUS at 23:23

## 2017-01-01 RX ADMIN — PANTOPRAZOLE SODIUM 40 MG: 40 INJECTION, POWDER, FOR SOLUTION INTRAVENOUS at 20:37

## 2017-01-01 RX ADMIN — CLINDAMYCIN PHOSPHATE 900 MG: 18 INJECTION, SOLUTION INTRAMUSCULAR; INTRAVENOUS at 21:06

## 2017-01-01 RX ADMIN — PANTOPRAZOLE SODIUM 40 MG: 40 INJECTION, POWDER, FOR SOLUTION INTRAVENOUS at 20:57

## 2017-01-01 RX ADMIN — TIMOLOL MALEATE 1 DROP: 5 SOLUTION/ DROPS OPHTHALMIC at 08:04

## 2017-01-01 RX ADMIN — PANTOPRAZOLE SODIUM 40 MG: 40 INJECTION, POWDER, FOR SOLUTION INTRAVENOUS at 20:45

## 2017-01-01 RX ADMIN — TIMOLOL MALEATE 1 DROP: 5 SOLUTION/ DROPS OPHTHALMIC at 21:45

## 2017-01-01 RX ADMIN — DEXTROSE MONOHYDRATE 50 ML: 25 INJECTION, SOLUTION INTRAVENOUS at 22:04

## 2017-01-01 RX ADMIN — TIMOLOL MALEATE 1 DROP: 5 SOLUTION/ DROPS OPHTHALMIC at 09:46

## 2017-01-01 RX ADMIN — PANTOPRAZOLE SODIUM 40 MG: 40 INJECTION, POWDER, FOR SOLUTION INTRAVENOUS at 08:03

## 2017-01-01 RX ADMIN — MORPHINE SULFATE 10 MG: 100 SOLUTION ORAL at 20:05

## 2017-01-01 RX ADMIN — CLINDAMYCIN PHOSPHATE 900 MG: 18 INJECTION, SOLUTION INTRAMUSCULAR; INTRAVENOUS at 11:54

## 2017-01-01 RX ADMIN — CLINDAMYCIN PHOSPHATE 900 MG: 18 INJECTION, SOLUTION INTRAMUSCULAR; INTRAVENOUS at 20:57

## 2017-01-01 RX ADMIN — TIMOLOL MALEATE 1 DROP: 5 SOLUTION/ DROPS OPHTHALMIC at 07:41

## 2017-01-01 RX ADMIN — SODIUM CHLORIDE, PRESERVATIVE FREE: 5 INJECTION INTRAVENOUS at 11:03

## 2017-01-01 RX ADMIN — TIMOLOL MALEATE 1 DROP: 5 SOLUTION/ DROPS OPHTHALMIC at 20:32

## 2017-01-01 RX ADMIN — DICLOFENAC SODIUM 2 G: 10 GEL TOPICAL at 17:28

## 2017-01-01 RX ADMIN — SODIUM CHLORIDE, PRESERVATIVE FREE: 5 INJECTION INTRAVENOUS at 11:59

## 2017-01-01 RX ADMIN — POTASSIUM CHLORIDE, DEXTROSE MONOHYDRATE AND SODIUM CHLORIDE: 150; 5; 450 INJECTION, SOLUTION INTRAVENOUS at 03:29

## 2017-01-01 RX ADMIN — MORPHINE SULFATE 1 MG: 2 INJECTION, SOLUTION INTRAMUSCULAR; INTRAVENOUS at 08:54

## 2017-01-01 RX ADMIN — MORPHINE SULFATE 10 MG: 100 SOLUTION ORAL at 17:18

## 2017-01-01 RX ADMIN — DICLOFENAC SODIUM 2 G: 10 GEL TOPICAL at 12:56

## 2017-01-01 RX ADMIN — PANTOPRAZOLE SODIUM 40 MG: 40 INJECTION, POWDER, FOR SOLUTION INTRAVENOUS at 09:45

## 2017-01-01 RX ADMIN — VANCOMYCIN HYDROCHLORIDE 1500 MG: 10 INJECTION, POWDER, LYOPHILIZED, FOR SOLUTION INTRAVENOUS at 03:13

## 2017-01-01 RX ADMIN — CLINDAMYCIN PHOSPHATE 900 MG: 18 INJECTION, SOLUTION INTRAMUSCULAR; INTRAVENOUS at 04:58

## 2017-01-01 RX ADMIN — CLINDAMYCIN PHOSPHATE 900 MG: 18 INJECTION, SOLUTION INTRAMUSCULAR; INTRAVENOUS at 04:06

## 2017-01-01 RX ADMIN — ATROPINE SULFATE 2 DROP: 10 SOLUTION/ DROPS OPHTHALMIC at 17:15

## 2017-01-01 RX ADMIN — ONDANSETRON 4 MG: 2 INJECTION INTRAMUSCULAR; INTRAVENOUS at 08:46

## 2017-01-01 RX ADMIN — CLINDAMYCIN PHOSPHATE 900 MG: 18 INJECTION, SOLUTION INTRAMUSCULAR; INTRAVENOUS at 05:28

## 2017-01-01 RX ADMIN — TIMOLOL MALEATE 1 DROP: 5 SOLUTION/ DROPS OPHTHALMIC at 08:42

## 2017-01-01 RX ADMIN — PANTOPRAZOLE SODIUM 40 MG: 40 INJECTION, POWDER, FOR SOLUTION INTRAVENOUS at 21:06

## 2017-01-01 RX ADMIN — CLINDAMYCIN PHOSPHATE 900 MG: 18 INJECTION, SOLUTION INTRAMUSCULAR; INTRAVENOUS at 04:16

## 2017-01-01 RX ADMIN — CLINDAMYCIN PHOSPHATE 900 MG: 18 INJECTION, SOLUTION INTRAMUSCULAR; INTRAVENOUS at 11:20

## 2017-01-01 RX ADMIN — TIMOLOL MALEATE 1 DROP: 5 SOLUTION/ DROPS OPHTHALMIC at 20:37

## 2017-01-01 RX ADMIN — ATROPINE SULFATE 2 DROP: 10 SOLUTION/ DROPS OPHTHALMIC at 22:48

## 2017-01-01 RX ADMIN — CLINDAMYCIN PHOSPHATE 900 MG: 18 INJECTION, SOLUTION INTRAMUSCULAR; INTRAVENOUS at 12:06

## 2017-01-01 RX ADMIN — CLINDAMYCIN PHOSPHATE 900 MG: 18 INJECTION, SOLUTION INTRAMUSCULAR; INTRAVENOUS at 20:36

## 2017-01-01 RX ADMIN — DICLOFENAC SODIUM 2 G: 10 GEL TOPICAL at 22:07

## 2017-01-01 RX ADMIN — LATANOPROST 1 DROP: 50 SOLUTION/ DROPS OPHTHALMIC at 21:56

## 2017-01-01 RX ADMIN — MORPHINE SULFATE 10 MG: 100 SOLUTION ORAL at 22:59

## 2017-01-01 RX ADMIN — GLYCOPYRROLATE 0.4 MG: 0.2 INJECTION, SOLUTION INTRAMUSCULAR; INTRAVENOUS at 06:25

## 2017-01-01 RX ADMIN — LATANOPROST 1 DROP: 50 SOLUTION/ DROPS OPHTHALMIC at 20:37

## 2017-01-01 RX ADMIN — ATROPINE SULFATE 2 DROP: 10 SOLUTION/ DROPS OPHTHALMIC at 19:49

## 2017-01-01 RX ADMIN — SODIUM CHLORIDE 1000 ML: 9 INJECTION, SOLUTION INTRAVENOUS at 00:35

## 2017-01-01 RX ADMIN — TIMOLOL MALEATE 1 DROP: 5 SOLUTION/ DROPS OPHTHALMIC at 11:00

## 2017-01-01 RX ADMIN — CLINDAMYCIN PHOSPHATE 900 MG: 18 INJECTION, SOLUTION INTRAMUSCULAR; INTRAVENOUS at 11:59

## 2017-01-01 RX ADMIN — VALPROATE SODIUM 250 MG: 100 INJECTION, SOLUTION INTRAVENOUS at 22:28

## 2017-01-01 RX ADMIN — LEVOFLOXACIN 500 MG: 5 INJECTION, SOLUTION INTRAVENOUS at 01:44

## 2017-01-01 RX ADMIN — TOBRAMYCIN SULFATE 480 MG: 40 INJECTION, SOLUTION INTRAMUSCULAR; INTRAVENOUS at 02:10

## 2017-01-01 RX ADMIN — LEVOFLOXACIN 500 MG: 5 INJECTION, SOLUTION INTRAVENOUS at 00:42

## 2017-01-01 RX ADMIN — DICLOFENAC SODIUM 2 G: 10 GEL TOPICAL at 22:03

## 2017-01-01 RX ADMIN — LATANOPROST 1 DROP: 50 SOLUTION/ DROPS OPHTHALMIC at 22:07

## 2017-01-01 RX ADMIN — TIMOLOL MALEATE 1 DROP: 5 SOLUTION/ DROPS OPHTHALMIC at 20:03

## 2017-01-01 RX ADMIN — DICLOFENAC SODIUM 2 G: 10 GEL TOPICAL at 08:42

## 2017-01-01 RX ADMIN — SODIUM CHLORIDE 1000 ML: 9 INJECTION, SOLUTION INTRAVENOUS at 00:49

## 2017-01-01 RX ADMIN — LEVOFLOXACIN 500 MG: 5 INJECTION, SOLUTION INTRAVENOUS at 23:20

## 2017-01-01 RX ADMIN — MORPHINE SULFATE 2 MG: 2 INJECTION, SOLUTION INTRAMUSCULAR; INTRAVENOUS at 23:06

## 2017-01-01 RX ADMIN — CLINDAMYCIN PHOSPHATE 900 MG: 18 INJECTION, SOLUTION INTRAMUSCULAR; INTRAVENOUS at 19:56

## 2017-01-01 RX ADMIN — CLINDAMYCIN PHOSPHATE 900 MG: 18 INJECTION, SOLUTION INTRAMUSCULAR; INTRAVENOUS at 04:32

## 2017-01-01 RX ADMIN — SODIUM CHLORIDE, PRESERVATIVE FREE: 5 INJECTION INTRAVENOUS at 18:48

## 2017-01-01 RX ADMIN — CLINDAMYCIN PHOSPHATE 900 MG: 18 INJECTION, SOLUTION INTRAMUSCULAR; INTRAVENOUS at 03:31

## 2017-01-01 RX ADMIN — BISACODYL 10 MG: 10 SUPPOSITORY RECTAL at 17:12

## 2017-01-01 RX ADMIN — PANTOPRAZOLE SODIUM 40 MG: 40 INJECTION, POWDER, FOR SOLUTION INTRAVENOUS at 08:37

## 2017-01-01 RX ADMIN — MORPHINE SULFATE 1 MG: 2 INJECTION, SOLUTION INTRAMUSCULAR; INTRAVENOUS at 00:46

## 2017-01-01 RX ADMIN — LEVOFLOXACIN 500 MG: 5 INJECTION, SOLUTION INTRAVENOUS at 01:00

## 2017-07-28 NOTE — Clinical Note
Admitting Physician: MIO ZIMMERMAN [[id number]]   [id number]   Bed Type: Cardiac Telemetry [44]   Special needs: Fall Risk [8]   Special needs: Confused [4]   Bed request comments: NEEDS TO BE NEAR DESK, PT IS HARD OF HEARING, BED REQUEST @ 7264

## 2017-07-28 NOTE — IP AVS SNAPSHOT
[facility] Intensive [facility] Unit    [address]    [facility] [address]    Phone:  [phone]    Fax:  [phone]                                       After Visit Summary   7/28/2017    Darion Palacios    MRN: [id number]           After Visit Summary Signature Page     I have received my discharge instructions, and my questions have been answered. I have discussed any challenges I see with this plan with the nurse or doctor.    ..........................................................................................................................................  Patient/Patient Representative Signature      ..........................................................................................................................................  Patient Representative Print Name and Relationship to Patient    ..................................................               ................................................  Date                                            Time    ..........................................................................................................................................  Reviewed by Signature/Title    ...................................................              ..............................................  Date                                                            Time

## 2017-07-28 NOTE — IP AVS SNAPSHOT
MRN:[id number]                      After Visit Summary   7/28/2017    Darion Palacios    MRN: [id number]           Thank you!     Thank you for choosing [facility] for your care. Our goal is always to provide you with excellent care. Hearing back from our patients is one way we can continue to improve our services. Please take a few minutes to complete the written survey that you may receive in the mail after you visit. If you would like to speak to someone directly about your visit please contact Patient Relations at [phone]. Thank you!          Patient Information     Date Of Birth          10/19/1929        Designated Caregiver       Most Recent Value    Caregiver    Will someone help with your care after discharge? yes    Name of designated caregiver Marcio    Phone number of caregiver [phone]    Caregiver address [place]      About your hospital stay     You were admitted on:  July 29, 2017 You last received care in the:  [facility] Intensive [facility] Unit    You were discharged on:  August 4, 2017       Who to Call     For medical emergencies, please call 911.  For non-urgent questions about your medical care, please call your primary care provider or clinic, [phone]          Attending Provider     Provider Specialty    Sena Quinn APRN CNP Nurse Practitioner - Family    Liseth Wagner MD Internal Medicine       Primary Care Provider Office Phone # Fax #    [facility] [phone]      After Care Instructions     Activity       Your activity upon discharge: activity as tolerated            Diet       Follow this diet upon discharge: Orders Placed This Encounter      Dysphagia Diet Level 1 Pureed Nectar Thickened Liquids (pre-thickened or use instant food thickener), comfort eating if desired.            Oxygen Adult       [place] Oxygen Order 2 liter(s) by nasal cannula continuously with use of  "portable tank. Expected treatment length is 1 months.. Test on conserving device as applicable.    Patients who qualify for home O2 coverage under the CMS guidelines require ABG tests or O2 sat readings obtained closest to, but no earlier than 2 days prior to the discharge, as evidence of the need for home oxygen therapy. Testing must be performed while patient is in the chronic stable state. See notes for O2 sats.    I certify that this patient, Darion Palacios has been under my care and that I, or a nurse practitioner or physician's assistant working with me, had a face-to-face encounter that meets the face-to-face encounter requirements with this patient on 8/4/2017. The patient, Darion Palacios was evaluated or treated in whole, or in part, for the following medical condition, which necessitates the use of the ordered oxygen. Treatment Diagnosis: PNA    Attending Provider: Wei Rueda  Physician signature: See electronic signature associated with these discharge orders  Date of Order: August 4, 2017                  Follow-up Appointments     Follow-up and recommended labs and tests        Follow up with home hospice.                  Pending Results     No orders found from 7/26/2017 to 7/29/2017.            Statement of Approval     Ordered          08/04/17 0749  I have reviewed and agree with all the recommendations and orders detailed in this document.  EFFECTIVE NOW     Approved and electronically signed by:  Wei Rueda DO             Admission Information     Date & Time Department Dept. Phone    7/28/2017 [facility] Intensive Care Unit [phone]      Your Vitals Were     Blood Pressure Pulse Temperature Respirations Height Weight    100/38 72 97.9  F (36.6  C) (Oral) 16 1.676 m (5' 6\") 74.5 kg (164 lb 3.9 oz)    Pulse Oximetry BMI (Body Mass Index)                94% 26.51 kg/m2          MyChart Information     [employer] lets you send messages to your doctor, view your test " "results, renew your prescriptions, schedule appointments and more. To sign up, go to [email or web address] . Click on \"Log in\" on the left side of the screen, which will take you to the Welcome page. Then click on \"Sign up Now\" on the right side of the page.     You will be asked to enter the access code listed below, as well as some personal information. Please follow the directions to create your username and password.     Your access code is: [email or web address]  Expires: 2017  8:05 AM     Your access code will  in 90 days. If you need help or a new code, please call your [place] clinic or [phone].        Care EveryWhere ID     This is your Care EveryWhere ID. This could be used by other organizations to access your [facility] [place] medical records  [id number]        Equal Access to Services     [facility] : Miracle Cheek, katelyn wright, nabeel trivedi, arjun pate . So [facility] [phone].    ATENCIÓN: Si habla español, tiene a canela disposición servicios gratuitos de asistencia lingüística. Marcelino al [phone].    We comply with applicable federal civil rights laws and [place] laws. We do not discriminate on the basis of race, color, national origin, age, disability sex, sexual orientation or gender identity.               Review of your medicines      START taking        Dose / Directions    acetaminophen 650 MG Suppository   Commonly known as:  TYLENOL   Used for:  End of life care   Replaces:  TYLENOL PO        Dose:  650 mg   Place 1 suppository (650 mg) rectally every 4 hours as needed for fever or mild pain   Quantity:  12 suppository   Refills:  0       atropine 1 % ophthalmic solution   Used for:  End of life care        Dose:  2-4 drop   Take 2-4 drops by mouth, place under tongue or place inside cheek every 2 hours as needed for other (terminal respiratory secretions) Not for ophthalmic use.   Quantity:  5 mL   Refills:  0       " diclofenac 1 % Gel topical gel   Commonly known as:  VOLTAREN   Used for:  End of life care        Dose:  2 g   Place 2 g onto the skin 4 times daily shoulders   Quantity:  1 Tube   Refills:  0       haloperidol 2 MG/ML (HIGH CONC) solution   Commonly known as:  HALDOL   Used for:  End of life care        Dose:  0.5-1 mg   Take 0.25-0.5 mLs (0.5-1 mg) by mouth, place under tongue or insert rectally every 6 hours as needed for agitation (nausea)   Quantity:  15 mL   Refills:  0       LORazepam 2 MG/ML (HIGH CONC) solution   Commonly known as:  ATIVAN   Used for:  End of life care        Dose:  0.25-0.5 mg   Take 0.13-0.25 mLs (0.26-0.5 mg) by mouth, place under tongue or insert rectally every 4 hours as needed for anxiety (restlessness)   Quantity:  30 mL   Refills:  0       MEDICATION INSTRUCTION   Used for:  End of life care        If care facility cannot accept or use ranges, facility is instructed to use lower end of dosing range   Refills:  0       mineral oil-hydrophilic petrolatum   Used for:  End of life care        Apply topically every 8 hours as needed for dry skin   Quantity:  50 g   Refills:  0       morphine sulfate HIGH CONCENTRATE 100 MG/5ML (HIGH CONC) solution   Commonly known as:  ROXANOL *CONCENTRATED*   Used for:  End of life care        Dose:  5-10 mg   Place 0.25-0.5 mLs (5-10 mg) under the tongue every 3 hours as needed for shortness of breath / dyspnea or moderate to severe pain   Quantity:  15 mL   Refills:  0       ondansetron 4 MG ODT tab   Commonly known as:  ZOFRAN-ODT   Used for:  End of life care        Dose:  4 mg   Take 1 tablet (4 mg) by mouth every 6 hours as needed for nausea or vomiting   Quantity:  5 tablet   Refills:  0         CONTINUE these medicines which have NOT CHANGED        Dose / Directions    bisacodyl 10 MG Suppository   Commonly known as:  DULCOLAX   Used for:  Constipation        Dose:  10 mg   Place 1 suppository (10 mg) rectally daily as needed for constipation  (IF Miralax ineffective)   Quantity:  30 suppository   Refills:  0       latanoprost 0.005 % ophthalmic solution   Commonly known as:  XALATAN        Dose:  1 drop   Place 1 drop into both eyes At Bedtime   Refills:  0       timolol 0.5 % ophthalmic solution   Commonly known as:  TIMOPTIC        Dose:  1 drop   Place 1 drop into both eyes 2 times daily   Refills:  0         STOP taking     ASPIRIN PO           cholecalciferol 1000 UNITS Tabs           divalproex 250 MG 24 hr tablet   Commonly known as:  DEPAKOTE ER           GLIPIZIDE XL PO           METFORMIN HCL PO           SERTRALINE HCL PO           TYLENOL PO   Replaced by:  acetaminophen 650 MG Suppository                Where to get your medicines      These medications were sent to [place], [place] - [address] [facility]  [address] [facility] [address]     Phone:  [phone]     acetaminophen 650 MG Suppository    diclofenac 1 % Gel topical gel    mineral oil-hydrophilic petrolatum    ondansetron 4 MG ODT tab         Some of these will need a paper prescription and others can be bought over the counter. Ask your nurse if you have questions.     Bring a paper prescription for each of these medications     atropine 1 % ophthalmic solution    haloperidol 2 MG/ML (HIGH CONC) solution    LORazepam 2 MG/ML (HIGH CONC) solution    morphine sulfate HIGH CONCENTRATE 100 MG/5ML (HIGH CONC) solution       You don't need a prescription for these medications     MEDICATION INSTRUCTION                Protect others around you: Learn how to safely use, store and throw away your medicines at www.disposemymeds.org.             Medication List: This is a list of all your medications and when to take them. Check marks below indicate your daily home schedule. Keep this list as a reference.      Medications           Morning Afternoon Evening Bedtime As Needed    acetaminophen 650 MG Suppository   Commonly known as:  TYLENOL   Place  1 suppository (650 mg) rectally every 4 hours as needed for fever or mild pain                                atropine 1 % ophthalmic solution   Take 2-4 drops by mouth, place under tongue or place inside cheek every 2 hours as needed for other (terminal respiratory secretions) Not for ophthalmic use.   Last time this was given:  2 drops on 8/3/2017 10:48 PM                                bisacodyl 10 MG Suppository   Commonly known as:  DULCOLAX   Place 1 suppository (10 mg) rectally daily as needed for constipation (IF Miralax ineffective)   Last time this was given:  10 mg on 8/3/2017  5:12 PM                                diclofenac 1 % Gel topical gel   Commonly known as:  VOLTAREN   Place 2 g onto the skin 4 times daily shoulders   Last time this was given:  2 g on 8/3/2017  9:48 PM                                haloperidol 2 MG/ML (HIGH CONC) solution   Commonly known as:  HALDOL   Take 0.25-0.5 mLs (0.5-1 mg) by mouth, place under tongue or insert rectally every 6 hours as needed for agitation (nausea)                                latanoprost 0.005 % ophthalmic solution   Commonly known as:  XALATAN   Place 1 drop into both eyes At Bedtime   Last time this was given:  1 drop on 8/3/2017  9:47 PM                                LORazepam 2 MG/ML (HIGH CONC) solution   Commonly known as:  ATIVAN   Take 0.13-0.25 mLs (0.26-0.5 mg) by mouth, place under tongue or insert rectally every 4 hours as needed for anxiety (restlessness)                                MEDICATION INSTRUCTION   If care facility cannot accept or use ranges, facility is instructed to use lower end of dosing range                                mineral oil-hydrophilic petrolatum   Apply topically every 8 hours as needed for dry skin                                morphine sulfate HIGH CONCENTRATE 100 MG/5ML (HIGH CONC) solution   Commonly known as:  ROXANOL *CONCENTRATED*   Place 0.25-0.5 mLs (5-10 mg) under the tongue every 3 hours as  needed for shortness of breath / dyspnea or moderate to severe pain   Last time this was given:  10 mg on 8/3/2017 10:59 PM                                ondansetron 4 MG ODT tab   Commonly known as:  ZOFRAN-ODT   Take 1 tablet (4 mg) by mouth every 6 hours as needed for nausea or vomiting                                timolol 0.5 % ophthalmic solution   Commonly known as:  TIMOPTIC   Place 1 drop into both eyes 2 times daily   Last time this was given:  1 drop on 8/3/2017  9:45 PM

## 2017-07-29 PROBLEM — J69.0 ASPIRATION PNEUMONIA (H): Status: ACTIVE | Noted: 2017-01-01

## 2017-07-29 PROBLEM — D64.9 ANEMIA: Status: ACTIVE | Noted: 2017-01-01

## 2017-07-29 NOTE — ED NOTES
Cough and SOB x 2 days- worse today congestion and fluid in the lungs are caregivers at nursing home. ABC Intact alert and no distress.

## 2017-07-29 NOTE — PROGRESS NOTES
RT Note: patient was removed brom bipap and placed on HFNC, due to fear of aspiration. Patient is coughing up phlem. NT suction copious amount tan and patient vomited. Currently on HFNC 30 lpm @ 60% for sats=97%. Will titrate as pt tolerates.   NT suctioning required throughout shift, right nare- moderate to large amount tan/creamy thick.

## 2017-07-29 NOTE — PROGRESS NOTES
"ICU End of Shift Summary.  For vital signs and complete assessments, please see documentation flowsheets.     Pertinent assessments: very coarse LS this morning with phlegm audible in back of throat but pt unable to follow commands to cough r/t dementia but when he did cough, he has a strong cough but unable to get such thick phlegm up. Pt nasally deep suctioned to help with secretions-done twice with good results as well as pt spitting up large amounts. Scopolamine patch applied today. Pt switched to [place] NC and weaned FiO2 from 60% to 40%. Pt not appropriate for bipap r/t dementia and copious amount of phlegm. IVF rate changed to 75ml/hr. Strict NPO. Speech to eval tomorrow. As day has progressed pt sounds more dry. Pt appears to be acting more like himself with the choice phrase he used toward staff, he daughter said \"yep, he must be feeling better if he said that\". BP soft-monitor for now-asymptomatic and pt resting comfortably. Incont of urine and 3 BMs today. Temp 101.5 ax today-bld cx done (pending).   Major Shift Events: see above  Plan (Upcoming Events): wean O2 as ligia. Suction PRN. Cont abx.   Discharge/Transfer Needs: TBD    Bedside Shift Report Completed : yes  Bedside Safety Check Completed: yes        "

## 2017-07-29 NOTE — CONSULTS
"CLINICAL NUTRITION SERVICES  -  ASSESSMENT NOTE      Recommendations Ordered by Registered Dietitian (RD):   N/a   Future/Additional Recommendations:    Do not anticipate patient will meet needs orally with disease progression   Malnutrition:     Severe malnutrition     REASON FOR ASSESSMENT  Darion Palacios is a 87 year old male seen by the dietitian for positive nutrition risk screen - Reduced oral intake over the last month    NUTRITION HISTORY  - Information obtained from chart review. No family at bedside and patient not able to relay info. Resides at [facility] - charts indicate previous dysphagia and choking events, comfort care level. POLST indicates PO only  - Food allergies/intolerances: NKFA  - Hx of dementia, TBI, DM    CURRENT NUTRITION ORDERS  - Diet: NPO pending SLP eval    PHYSICAL FINDINGS  Observed  Fat wasting:    Orbital region and surrounding area - hollow look    Upper arm region / triceps/biceps - some depth pinch but not ample  Muscle wasting:    Clavicle and acromion bone region: deltoid muscle - shoulder to arm joint look square and clavicle pronounced    Patellar/quadriceps region - knee cap less prominent    Anterior thigh region - mild depression on inner thigh    Gastrocnemius/Posterior Calf region - thin  Obtained from Chart/Interdisciplinary Team  BM: 7/29  Coy nutrition score: 2; total score: 15    ANTHROPOMETRICS  Height: 5' 6\"  Weight: 68 kg   Body mass index is 24.2 kg/(m^2).  Weight Status:  Normal BMI  IBW: 64.4 kg +/- 10%  % IBW:  106%  Weight History:  Unable to evaluate with no recent weight documentation in EMR  Wt Readings from Last 10 Encounters:   07/29/17 68 kg (149 lb 14.6 oz)   09/10/14 77.1 kg (170 lb)   08/05/14 79.4 kg (175 lb)       LABS  Labs reviewed, + urinary ketones    MEDICATIONS  Medications reviewed, IVF at 125 mL/hr    ASSESSED NUTRITION NEEDS (PER APPROVED PRACTICE GUIDELINES, Dosing weight: 68 kg):  Estimated Energy Needs: 8977-9536 kcals (25-30 " Kcal/Kg)  Justification: maintenance but likely unable to meet orally  Estimated Protein Needs:  grams protein (1.2-1.5 g pro/Kg)  Justification: preservation of lean body mass  Estimated Fluid Needs: >1 mL/Kcal  Justification: maintenance    MALNUTRITION:  % Weight Loss: Unable to determine  % Intake: Unable to determine  Subcutaneous Fat Loss: Moderate, as noted above  Muscle Loss: Moderate, as noted above  Fluid Retention:None noted    Malnutrition Diagnosis: Severe malnutrition  In Context of:  Chronic illness or disease and Environmental or social circumstances    NUTRITION DIAGNOSIS:  Malnutrition related to disease progression and dysphagia as evidenced by fat and muscle wasting    INTERVENTIONS  Recommendations / Nutrition Prescription  Diet consistency/texture per SLP. Do not anticipate patient can  Meet estimated needs orally - agree with palliative/comfort approach    Implementation  Nutrition education: Not appropriate at this time due to patient condition    Goals  POC within 48 hours to address disease progression in related to PO intake    MONITORING AND EVALUATION:  Progress towards goals will be monitored and evaluated per protocol and Practice Guidelines      Amina Hernandez RDN LD, [facility]  3rd floor/ICU: [phone]  All other floors: [phone]  Weekend/holiday: [phone]  Office: [phone]

## 2017-07-29 NOTE — PROGRESS NOTES
Addendum: Patient seen and examined.  Agree with assessment and plan as outlined earlier this morning by Dr. Wagner.  Briefly, patient is an 87-year-old male with advanced dementia who resides in long-term care at [facility], history also significant for traumatic brain injury, frequent falls, and type 2 diabetes who presented here with shortness of breath.  He was found to have bilateral pneumonia.  Patient actually had a choking episode three days ago requiring Heimlich maneuver.  On presentation, he was notably hypoxemic and chest x-ray demonstrated bilateral infiltrates.  Concerned for aspiration pneumonia.  Given his penicillin allergy, he was started on Levaquin and clindamycin appropriately.  Since presentation, he was placed on BiPAP but did have an episode of emesis so he was placed on high flow nasal cannula instead.  Notably anemic on presentation but no evidence of acute blood loss anemia.  He did have a Hemoccult positive stool but no evidence of acute blood loss anemia.  Could possibly be secondary to subacute GI bleed.  He was transfused with one unit of packed RBC with good results with hemoglobin rise to 8.7.  At this time, he is too unstable to undergo any sort of endoscopic procedure so he'll be maintained on IV Protonix q.12 hours unless he is actively bleeding and workup will depend on goals of cares when family is able to meet and decide.  Daughter is power of .  We'll resume necessary medications through the IV.  Strict n.p.o. for now.    Time spent: 25 minutes

## 2017-07-29 NOTE — PROGRESS NOTES
Transported pt on BIPAP from ED to ICU without incident. Pt remains on 14/7 R-12 45% and is tolerating it well. BS are crackles throughout. Respiratory will continue to monitor.     Meagan Mccullough RRT

## 2017-07-29 NOTE — ED PROVIDER NOTES
History     Chief Complaint:  Shortness of breath    HPI   History limited secondary to dementia. History provided by family.     Darion Palacios is a 87 year old male with a history of dementia and TBI who presents with shortness of breath. Recently, the patient has been experiencing chest congestion. Today, the patient's chest congestion became more severe and he has been experiencing shortness of breath. The nurses at [facility] where he lives called his family and recommended that he come into the ED. The patient did have a choking incident last week and staff needed to do the Heimlich maneuver 3 times in order to remove the foreign body. Since that time, the patient has been spitting out food and liquids and has had difficulty swallowing. The patient is not on oxygen at home. The patient's daughter reports that 2 years ago he had acute blood loss with a medical work up that did not reveal a source and needed to receive a blood transfusion.     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 No pertinent family history.    Social History:  Marital Status:   Presents to the ED with his family  Tobacco Use: negative  Alcohol Use: negative  PCP: [facility]     Review of Systems   Unable to perform ROS: Dementia     Physical Exam   First Vitals:  BP: 125/53  Pulse: 88  Temp: 98.9  F (37.2  C)  Resp: 24  Weight: 74.8 kg (165 lb)  SpO2: (!) 86 %    Physical Exam  General: Appears stated age. Using oxygen  currently.  HENT: Atraumatic. TM's intact. No posterior oropharynx swelling/erythema, or exudate.  Eyes: No scleral injection, conjunctivitis, or drainage.    Neck: Supple with normal ROM.  No meningeal signs  Cardio: Regular rate and rhythm, no murmurs, rubs, or gallops  Pulmonary/Chest: Coarse lung sounds with some crackles.  Abdomen: Soft, non-tender, normal bowel sounds, no rebound or guarding  Musculoskeletal: No pedal edema, normal gait. PT/DP 2+   Lymph: No anterior or posterior lymphadenopathy.   Neuro: Alert and oriented, no focal deficits noted.   Skin: Normal color and temperature, no rashes to visible exposed skin. Looks pale.  Psych: Mood and affect normal.      Emergency Department Course   ECG:  @ 2205  Indication: shortness of breath  Vent. Rate 94 bpm. MA interval 216 ms. QRS duration 70 ms. QT/QTc 350/437 ms. P-R-T axis 56 6 23.   Sinus rhythm with 1st degree AV block. Nonspecific ST abnormality. Abnormal ECG.    Read by Dr. Sena Quinn NP.    Imaging:  Radiographic findings were communicated with the patient who voiced understanding of the findings.    XR Chest 2 Views  1. A few hazy opacities in bilateral lung bases are nonspecific and  could represent atelectasis and/or pneumonia.  2. No other findings suspicious for active cardiopulmonary disease.  3. Probable chronic obstructive pulmonary disease.  Report per radiology.    Laboratory:  UA: Clear yellow urine, urineketon 20, blood small, RBC 18 (H), mucous present, otherwise WNL    CBC:  WBC 12.8 (H), HGB 6.7 (LL),  (H)   CMP: glucose 176 (H), creatinine 0.58 (L), otherwise WNL   Lactic acid: 2.9 (H)   Troponin: 0.015    Stool: occult blood: positive    Interventions:  (0033) Normal Saline, 1 liter, IV bolus  (0049) Normal Saline, 1 liter, IV bolus  (0049) Normal Saline, 1 liter, IV bolus  (0100) Levaquin, 500 mg, IV  Vancomycin, 1,500 mg, IV  Tobramycin, 480 mg, IV    Emergency Department Course:  Nursing notes and vitals reviewed.  I  performed an exam of the patient as documented above.   EKG was done, interpretation as above.  A peripheral IV was established. Blood was drawn from the patient. This was sent for laboratory testing, findings above.   Urine sample was obtained and sent for laboratory analysis, findings above.  Stool sample was obtained and sent for laboratory analysis, findings above.   The patient was sent for a chest x-ray while in the emergency department, findings above.   (0006) The daughter signed the blood transfusion form. They are all in agreement with the need for transfusion and understand the risks.  (0050) Because of the patient s penicillin allergy and meropenem has an interaction with penicillin, the pharmacist is looking for an alternative and will follow up with me.  Findings and plan explained to the patient who consents to admission.   (0105) I discussed the patient with Dr. Wagner of the hospitalist service, who will admit the patient to a telemetry bed for further monitoring, evaluation, and treatment.  (0115) The pharmacist recommended Tobramycin.  I personally reviewed the laboratory results with the patient and answered all related questions prior to admit.   (0140) Spoke with daughter regarding current DNI/R status patient working hard to breath daughter agreed with use of Bipap. Paged RT patient will be placed in Bipap.       Impression & Plan    Medical Decision Making:  Darion Palacios is a 87 year old male who presents for evaluation of shortness of breath.  They are anemic on labs, recheck hemoglobin 6.2.  A broad differential was of course considered, most probable etiologies include anemia of chronic disease, genetic disorder with abnormal hemoglobin, acute blood loss, slow or fast gastrointestinal bleeding, iron deficiency anemia, etc. Given exam, history and labs, I feel that there is no etiology of his anemia at this time, guaiaic is positive. Patient will be admitted for further work up. Chest  x-ray also reveals pneumonia and the patient has been started on IV antibiotics based on sepsis hospital protocol. Based on hgb, I recommended blood transfusion and admission to the hospital family agrees with plan. They consent to this. Discussed with medicine team who accepted patient for admission. Due patient being on Bipap will be admitted to ICU bed.    Diagnosis:    ICD-10-CM    1. Hypoxia R09.02 CBC with platelets differential     Comprehensive metabolic panel     Lactic acid whole blood     Troponin I     UA with Microscopic     ABO/Rh type and screen     Stool: occult blood     Hemoglobin   2. Anemia due to blood loss, acute D62    3. HCAP (healthcare-associated pneumonia) J18.9    4. Guaiac + stool R19.5        Disposition:  Admit to telemetry    I, Ronald Aparicio, am serving as a scribe on 7/28/2017 at 10:11 PM to personally document services performed by DANIELLE Marsh, CNP based on my observations and the provider's statements to me.        Sena Quinn APRN CNP  07/29/17 0242

## 2017-07-29 NOTE — PHARMACY-ADMISSION MEDICATION HISTORY
Admission medication history interview status for this patient is complete. See [facility] admission navigator for allergy information, prior to admission medications and immunization status.     Medication history interview source(s):Caregiver  Medication history resources (including written lists, pill bottles, clinic record):written list from [facility] (and called)  Primary pharmacy:    Changes made to PTA medication list:  Added: Glipizide, Metformin  Deleted: Aricept, Lidoderm, Rovbaxin, Miconazole, Oxycodone, protonix, Miralax, Seroquel, Senna/Doc, tramadol  Changed: Sertraline, Timolol, Depakote, Xalatan    Actions taken by pharmacist (provider contacted, etc):sticky note for MD (will talk to him when arrives in ICU)     Additional medication history information:Had to call to clarify eye(s) for timolol also asked if splitting XL depakote- they are  Not it is a 250 mg tab    Medication reconciliation/reorder completed by provider prior to medication history? Yes    Do you take OTC medications (eg tylenol, ibuprofen, fish oil, eye/ear drops, etc)? Y  (Y/N)    For patients on insulin therapy: N (Y/N)  Lantus/levemir/NPH/Mix 70/30 dose:   (Y/N) (see below)   Sliding scale Novolog Y/N  If Yes, do you have a baseline novolog pre-meal dose:  units with meals   Patients eat three meals a day:   Y/N     Any Barriers to therapy:  cost of medications/comfortable with giving injections (if applicable)/ comfortable and confident with current diabetes regimen:       Prior to Admission medications    Medication Sig Last Dose Taking? Auth Provider   divalproex (DEPAKOTE ER) 250 MG 24 hr tablet Take 250 mg by mouth At Bedtime 7/27/2017 Yes Unknown, Entered By History   GLIPIZIDE XL PO Take 5 mg by mouth every morning 7/28/2017 at Unknown time Yes Unknown, Entered By History   latanoprost (XALATAN) 0.005 % ophthalmic solution Place 1 drop into both eyes At Bedtime 7/27/2017 Yes Unknown, Entered By History   METFORMIN HCL PO Take  500 mg by mouth 2 times daily (with meals) 7/28/2017 at Unknown time Yes Unknown, Entered By History   SERTRALINE HCL PO Take 100 mg by mouth every evening 7/27/2017 Yes Unknown, Entered By History   Acetaminophen (TYLENOL PO) Take 1,000 mg by mouth 2 times daily 7/28/2017 at Unknown time Yes Unknown, Entered By History   timolol (TIMOPTIC) 0.5 % ophthalmic solution Place 1 drop into both eyes 2 times daily 7/28/2017 at Unknown time Yes Unknown, Entered By History   bisacodyl (DULCOLAX) 10 MG suppository Place 1 suppository (10 mg) rectally daily as needed for constipation (IF Miralax ineffective)  Yes Lauren Barajas NP   cholecalciferol 1000 UNITS TABS Take 1,000 Units by mouth daily 7/28/2017 at Unknown time Yes Lauren Barajas NP   ASPIRIN PO Take 81 mg by mouth daily 7/28/2017 at Unknown time Yes Reported, Patient

## 2017-07-29 NOTE — H&P
[facility]  Hospitalist Admission Note  Name: Darion Palacios    MRN: [id number]  YOB: 1929    Age: 87 year old  Date of admission: 7/28/2017              Brief patient summary: Pt is a 87 yr old male with advance dementia (resides at [facility]), TBI, DM2, frequent falls who presented on 7/29/2017 with SOB and was found to have b/l PNA. He was also noted to have anemia with Hb 6.7 without overt bleeding.          Assessment and Plan:   Sepsis (wbc 12.5K, lactic acidosis) ,hypoxic resp failure 2/2 aspiration PNA: per daughter, pt had a choking episode 3 days ago and received heimlich maneuver at NH x3. He has since then become weaker, lost appetite and appeared to have SOB. He is febrile here with leukocytosis and lactic acidosis. CXr shows b/l infiltrate vs atelectasis however favor aspiration PNa based on hx.  --Pt has penicillin allergy (anaphylaxis) so would cover with levolfoxacin +clindamycin  --cont BIPAP, wean as able   --IVF  --NPO, SLP eval when ready (pt has h/o recurrent aspirations per chart review)  --monitor lactic acid    Anemia: Hb dropped from 14 in 2014 ->6.2 gm today. Pt has h/o anemia in the past and w/u by hematology had been -ve . He was recommended to have colonoscopy however this was deferred by her daughter due to his advance age and dementia. Suspect pt has subacute GIB given +ve stool occult blood and h/o gastritis/GERD. Cannot r/o underlying malignancy   --hold ASA  --will start on PPI IV BID until Hb stable   --2 U of blood (1 U in the ER and 1 U in the ICU)  --consider GI consult if further e/o GIB however pt unlikely to tolerate EGD/colonoscopy due to resp failure     Hypotension:likely due to blood loss anemia. Monitor after 2 U blood transfusion. Lactic acid is improving.    Advanced dementia w/ behavioral disturbance, h/o TBI: resume Seroquel,donepezil,depakote. High risk for delirium     Chronic pain on narcotics since TBI: resume prn oxycodone,  tramadol, robaxin     DVT Prophylaxis: VTE Prophylaxis contraindicated due to concern for GIB  Discharge Dispo: LTC  Estimated Disch Date / # of Days until Disch: >3 days   DNR / DNI- discussed with daughter. She is conflicted about DNI and wants him to be intubated if necessary however after discussion about risks of intubation including delirium and inability to wean-she is agreeable to keep him DNI. would consult palliative care for further clarification of goals of care     Chief Complaint: sob         History of Present Illness:   Discussed with ER physician : linwood HAMILTON    Darion Palacios is a 87 year old male who presents with SOB. Pt is unable to provide any hx d/t dementia and wearing bipap so hx was obtained through daughter and chart review.   The patient did have a choking incident last week and staff needed to do the Heimlich maneuver 3 times in order to remove the foreign body. Since that time, the patient has been spitting out food and liquids and has had difficulty swallowing. The patient is not on oxygen at home. The patient's daughter reports that 2 years ago he had acute blood loss with a medical work up that did not reveal a source and needed to receive a blood transfusion.   Since then the patient has been experiencing chest congestion. Today, the patient's chest congestion became more severe and he has been experiencing shortness of breath. The nurses at [facility] where he lives called his family and recommended that he come into the ED.          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      Frequent falls      GERD (gastroesophageal reflux  disease)      Glaucoma      Hearing loss      TBI (traumatic brain injury) (H)                 Past Surgical History:     Past Surgical History:   Procedure Laterality Date     CHOLECYSTECTOMY  2004ish     VERTEBROPLASTY  2/2014               Home Medications:     Prior to Admission medications    Medication Sig Last Dose Taking? Auth Provider   oxyCODONE (ROXICODONE) 5 MG immediate release tablet Take 1 tablet (5 mg) by mouth every 4 hours as needed for moderate to severe pain   Lauren Barajas NP   traMADol (ULTRAM) 50 MG tablet Take 0.5-1 tablets (25-50 mg) by mouth every 6 hours as needed for moderate to severe pain   Lauren Barajas NP   acetaminophen (TYLENOL) 500 MG tablet Take 2 tablets (1,000 mg) by mouth 3 times daily   Lauren Barajas NP   QUEtiapine (SEROQUEL) 25 MG tablet Take 1 tablet (25 mg) by mouth 2 times daily as needed (agitation)   Lauren Barajas NP   lidocaine (LIDODERM) 5 % patch Place 1-2 patches onto the skin every 24 hours On for 12 hours, OFF for 12 hours. Please apply to low back.   Lauren Barajas NP   miconazole (MICATIN) 2 % cream Apply topically 2 times daily   Lauren Barajas NP   bisacodyl (DULCOLAX) 10 MG suppository Place 1 suppository (10 mg) rectally daily as needed for constipation (IF Miralax ineffective)   Lauren Barajas NP   polyethylene glycol (MIRALAX/GLYCOLAX) packet Take 17 g by mouth daily as needed for constipation   Lauren Barajas NP   senna-docusate (SENOKOT-S;PERICOLACE) 8.6-50 MG per tablet Take 1-2 tablets by mouth 2 times daily   Lauren Barajas NP   methocarbamol (ROBAXIN) 750 MG tablet Take 1 tablet (750 mg) by mouth 3 times daily   Lauren Barajas NP   cholecalciferol 1000 UNITS TABS Take 1,000 Units by mouth daily   Lauren Barajas NP   ASPIRIN PO Take 81 mg by mouth daily   Reported, Patient   divalproex (DEPAKOTE ER) 250 MG 24 hr tablet Take 250 mg by mouth 2 times daily   Reported,  "Patient   DONEPEZIL HCL PO Take 10 mg by mouth   Reported, Patient   PANTOPRAZOLE SODIUM PO Take 40 mg by mouth daily   Reported, Patient   SERTRALINE HCL PO Take 25 mg by mouth daily   Reported, Patient   latanoprost (XALATAN) 0.005 % ophthalmic solution 1 drop At Bedtime   Reported, Patient   timolol (TIMOPTIC) 0.25 % ophthalmic solution 1 drop every morning    Reported, Patient            Allergies:     Allergies   Allergen Reactions     Penicillins Anaphylaxis     Contrast Dye Hives            Social History:     Social History     Social History     Marital status:      Spouse name: N/A     Number of children: 7     Years of education: N/A     Occupational History      Retired     Social History Main Topics     Smoking status: Never Smoker     Smokeless tobacco: Never Used     Alcohol use No     Drug use: No     Sexual activity: Not on file     Other Topics Concern     Not on file     Social History Narrative    Marcio Mott -- court assigned guardian                  Family History:    reviewed and noncontributory             Review of Systems:   The 10 point Review of Systems is negative other than noted in the HPI.              Physical Exam:     Heart Rate: 92, Blood pressure 139/71, pulse 93, temperature 100.3  F (37.9  C), temperature source Axillary, resp. rate 24, height 1.676 m (5' 6\"), weight 74.8 kg (165 lb), SpO2 97 %.  165 lbs 0 oz     General: older man, Alert, awake,on bipap , very hard of hearing   HEENT: NC/AT, eyes anicteric, external occular movements intact, face symmetric.  Dentition WNL, MM moist.  Cardiac: RRR, S1, S2.  No murmurs appreciated.  Pulmonary: b/l coarse crackles on the anterior chest   Abdomen: soft, mildly tender in the spigastrium, non-distended.  Bowel Sounds Present.  No guarding.  Extremities: no deformities.  Warm, well perfused.  Skin: no rashes or lesions noted.  Warm and Dry.  Neuro: No focal deficits noted.  Speech clear.  Coordination and " strength grossly normal.  Psych: Appropriate affect.         Data:   All new lab and imaging data was reviewed.    Recent Labs  Lab 07/29/17  0130 07/28/17  2227   WBC  --  12.8*   HGB 6.2* 6.7*   HCT  --  24.2*   MCV  --  77*   PLT  --  548*       Recent Labs  Lab 07/28/17 2227      POTASSIUM 4.4   CHLORIDE 103   CO2 21   ANIONGAP 11   *   BUN 17   CR 0.58*   GFRESTIMATED >90Non  GFR Calc   GFRESTBLACK >90African American GFR Calc   GLENNA 8.6          Imaging:  Results for orders placed or performed during the hospital encounter of 07/28/17   XR Chest 2 Views    Narrative    CHEST 2 VIEWS  7/28/2017 11:55 PM     HISTORY: Hypoxia.    COMPARISON: 8/5/2014.    FINDINGS: Flattening of the diaphragm on the lateral projection image,  suggestive of chronic obstructive pulmonary disease. A few hazy  opacities in bilateral lung bases. The lungs are otherwise clear.  Possible cardiomegaly. Atherosclerotic calcification in the thoracic  aorta. Moderate to large hiatal hernia again noted.      Impression    IMPRESSION:  1. A few hazy opacities in bilateral lung bases are nonspecific and  could represent atelectasis and/or pneumonia.  2. No other findings suspicious for active cardiopulmonary disease.  3. Probable chronic obstructive pulmonary disease.    MD Liseth THAYER MD  Hospitalist  [facility] [address] [place] [address] [phone]

## 2017-07-29 NOTE — PROGRESS NOTES
"Intensivist Note    Patient fully assessed this morning. He came in overnight with a 3 day history of feeling poorly, not eating, and dyspnea, after an episode of choking that required a heimlich maneuver. On assessment in ED he was found to be hypoxemic and started on 60% bipap, with CXR consistent with aspiration pneumonitis, and was admitted to ICU. He has been on bipap intermittently with high flow oxygen, as he has been having nausea.    He has a history of dementia, and is DNR    Physical exam   Well developed male compensated on high flow oxygen at the time of exam  /58  Pulse 93  Temp 99.7  F (37.6  C) (Axillary)  Resp 25  Ht 1.676 m (5' 6\")  Wt 68 kg (149 lb 14.6 oz)  SpO2 97%  BMI 24.2 kg/m2  Lungs with coarse rhonchi throughout bilaterally  Heart is RRR with distant heart sounds  Abdomen is soft and entirely non-tender throughout; modest obesity  Extremities are warm with minimal edema  Skin shows no cyanosis or mottling  CNS moves all extremities well to command though seems very weak all over    Labs reviewed  CXR reviewed    Assessment and Plan  1. Aspiration pneumonia- compensated at present but requiring high oxygen and at least intermittent bipap support. We will continue this and focus on pulmonary hygiene. We will titrate support down as able   2. Anemia- receiving 2 units of RBC's for an initial Hb of about 6. We will monitor for blood loss, but none obvious at this time. It is likely chronic disease, but will continue to monitor for possible occult blood loss.  3. Dementia  4. CKD  5. DNR  Overall, he is acute and critical. 35 minutes of critical time spent at bedside. Case discussed with Dr. Payan, and appreciate his assistance.     corie gee  July 29, 2017    "

## 2017-07-29 NOTE — PLAN OF CARE
Problem: Goal Outcome Summary  Goal: Goal Outcome Summary     SLP - New orders received for swallow evaluation. Chart reviewed, discussed with RN - no oral meds today due to pt respiratory status. Not appropriate for PO trials. Will schedule evaluation for tomorrow 7/30.

## 2017-07-29 NOTE — PLAN OF CARE
ICU End of Shift Summary.  For vital signs and complete assessments, please see documentation flowsheets.     Pertinent assessments: pt alert to self only. Pt remains on bipap at 45%. Pt received 2 units of blood overnight for a hemoglobin of 6.2. Lungs very coarse, nonproductive cough.   Major Shift Events: admit from the ER  Plan (Upcoming Events): continue current cares, speech eval when stable off the bipap.   Discharge/Transfer Needs: resides at [facility], plans to discharge there when able    Bedside Shift Report Completed :   Bedside Safety Check Completed:

## 2017-07-29 NOTE — ED NOTES
".  [facility]  ED Nurse Handoff Report    Darion Palacios is a 87 year old male   ED Chief complaint: Shortness of Breath  . ED Diagnosis:   Final diagnoses:   Hypoxia   Anemia due to blood loss, acute   HCAP (healthcare-associated pneumonia)   Guaiac + stool     Allergies:   Allergies   Allergen Reactions     Penicillins Anaphylaxis     Contrast Dye Hives       Code Status: Full Code  Activity level - Baseline/Home:  Stand with Assist of 2. Activity Level - Current:   Total Care. Lift room needed: No. Bariatric: No   Needed: No   Isolation: No. Infection: Not Applicable.     Vital Signs:   Vitals:    07/29/17 0030 07/29/17 0045 07/29/17 0115 07/29/17 0118   BP: 123/64 116/65 98/49    Pulse:       Resp: (!) 32      Temp:       SpO2: 92% 93% 92%    Weight:       Height:    1.676 m (5' 6\")       Cardiac Rhythm:  ,      Pain level: 0-10 Pain Scale: 0  Patient confused: Yes. Patient Falls Risk: Yes.   Elimination Status: Has voided -- wears adult diaper  Patient Report - Initial Complaint: Cough, gurgling sound with breathing. Focused Assessment: Upper airway gurgling sounds, awake, alert at baseline (he often thinks it is 1986 and that Alfonso is president).  He is very hard of hearing and does not talk much any more per his family.  He appears pale.  Wearing adult diaper for baseline urinary incontinence.  Afebrile.  SPO2 86% on arrival.  Patient choked on food about one week ago - this was the first episode of this type.  Since then he has progressive cough.   Tests Performed: Labs, chest xray, repeat hgb. Abnormal Results:Lactic Acid: 2.9 mmol/L  WBC: 12.8 10e9/L [Ref Range: 4.0 - 11.0]  RBC Count: 3.13 10e12/L [Ref Range: 4.4 - 5.9]  Hemoglobin: 6.7 g/dL [Ref Range: 13.3 - 17.7] (This result has been called to XI SUAREZ by Daniel Gutierrez on 07 28 2017     Anion Gap: 11 mmol/L [Ref Range: 3 - 14]  Glucose: 176 mg/dL [Ref Range: 70 - 99]  Urea Nitrogen: 17 mg/dL [Ref Range: 7 - " 30]  Creatinine: 0.58 mg/dL [    Abnormal Result -   Occult Blood: Positive      .   Treatments provided: Patient has two IVs; blood cultures done.  Patient may  Need PRBCs, repeat Hgb is pending. Blood Consent is signed.  Family Comments: Here with patient.  They want to be certain he has a work-up to explain the airway sounds.  OBS brochure/video discussed/provided to patient:  N/A  ED Medications:   Medications   0.9% sodium chloride BOLUS (0 mLs Intravenous Stopped 7/29/17 0033)     Followed by   0.9% sodium chloride infusion (0 mLs Intravenous Stopped 7/29/17 0049)   0.9% sodium chloride BOLUS (1,000 mLs Intravenous New Bag 7/29/17 0049)     Followed by   0.9% sodium chloride infusion (not administered)   levofloxacin (LEVAQUIN) infusion 500 mg (500 mg Intravenous New Bag 7/29/17 0100)   vancomycin (VANCOCIN) 1500 mg in 0.9% NaCl 250 mL PREMIX (not administered)   tobramycin (NEBCIN) 480 mg in NaCl 0.9 % intermittent infusion (not administered)     Drips infusing:  Yes  For the majority of the shift this patient was Yellow. Interventions performed were Diaper changes, meds, some airway suctioning.     Severe Sepsis OR Septic Shock Diagnosis Present:   Yes    Per the ED Provider, Time Zero for severe sepsis or septic shock is:  2330    3 Hour Severe Sepsis Bundle Completion:  1. Initial Lactic Acid Result:   Recent Labs   Lab Test  07/28/17   2227   LACT  2.9*     2. Blood Cultures before Antibiotics: Yes  3. Broad Spectrum Antibiotics Administered:     Anti-infectives (Future)    Start     Dose/Rate Route Frequency Ordered Stop    07/29/17 0121  tobramycin (NEBCIN) 480 mg in NaCl 0.9 % intermittent infusion      7 mg/kg × 68.2 kg (Adjusted)  50 mL/hr over 60 Minutes Intravenous ONCE 07/29/17 0121      07/29/17 0053  levofloxacin (LEVAQUIN) infusion 500 mg      500 mg  100 mL/hr over 60 Minutes Intravenous ONCE 07/29/17 0052      07/29/17 0053  vancomycin (VANCOCIN) 1500 mg in 0.9% NaCl 250 mL PREMIX      Comments:  Dose adjusted by pharmacy based on wt of 75kg, SrCr=0.6    1,500 mg  166.7 mL/hr over 90 Minutes Intravenous ONCE 07/29/17 0052          4.2000 ml of IV fluids have been given so far      6 Hour Severe Sepsis Bundle Completion:    1. Repeat Lactic Acid Level: Not drawn  2. Patient currently on Vasopressors =  No        ED Nurse Name/Phone Number: Meagan Smith,   1:36 AM

## 2017-07-30 NOTE — PLAN OF CARE
Problem: Goal Outcome Summary  Goal: Goal Outcome Summary     SLP - Chart review and check-in with RN on respiratory status, swallow evaluation readiness. Given HFNC >20L (30L @ 30%) and hx of aspiration including recent choking event leading to hospitalization, hold evaluation until decreased O2 needs. Note pt/family to have Palliative Care conference tomorrow 7/31/17 which will address goals of care for nutrition and assist with swallow evaluation recommendations.      Cont NPO and frequent oral cares in interim.

## 2017-07-30 NOTE — PROGRESS NOTES
Patient remains on HFNC with current settings 25 lpm @ 30%. Sats=96-99%. Will continue to monitor.

## 2017-07-30 NOTE — PROGRESS NOTES
Pt continues on HFNC 30L and 30%. SpO2 in the low to mid 90's and RR about 20. BL BS are coarse crackles t/o.     Meagan Mccullough

## 2017-07-30 NOTE — PLAN OF CARE
Problem: Goal Outcome Summary  Goal: Goal Outcome Summary  Outcome: No Change  ICU End of Shift Summary.  For vital signs and complete assessments, please see documentation flowsheets.      Pertinent assessments: No changes from prior care, see previous note by previous caregiver.   Major Shift Events: none  Plan (Upcoming Events): Hgb re-check, palliative care conference pending in am, speech eval pending.  Discharge/Transfer Needs: TBD     Bedside Shift Report Completed :   Bedside Safety Check Completed:

## 2017-07-30 NOTE — PROGRESS NOTES
[facility] Progress Note    I met with Daniel and his family. Daniel was resting in his bed, while his sister and a female relative was sitting in chairs. The family members discussed Daniel's life with me. I recited a couple of devotions and lifted Daniel and his family up in prayer. Daniel's family has requested a Baptism blessing from Spiritual Services; I have made contact with the Baptism .       Solomon Rodriguez   Intern  [phone]

## 2017-07-30 NOTE — PLAN OF CARE
Problem: Goal Outcome Summary  Goal: Goal Outcome Summary  Outcome: No Change  ICU End of Shift Summary.  For vital signs and complete assessments, please see documentation flowsheets.   Pt sleeping on and off throughout the day. Speech came to see pt, but pt at that time was drowsy.   BP dips at times, nafisa when pt on side.   1 unit blood given today. No obvious signs of bleeding.  Pt sounds very congested. Pt able to clear secretions most of the time. Coughing up purulent sputum.  Continues on hiflow.     Pertinent assessments: As above  Major Shift Events: None  Plan (Upcoming Events): Family care conference tomorrow  Discharge/Transfer Needs: Not discussed      Bedside Shift Report Completed :   Bedside Safety Check Completed:

## 2017-07-30 NOTE — PROGRESS NOTES
[facility]  Hospitalist Progress Note  Name: Darion Palacios    MRN: [id number]  YOB: 1929    Age: 87 year old  Date of admission: 7/28/2017  Primary care provider: [facility]      Reason for Stay (Diagnosis): Acute hypoxic respiratory failure secondary to aspiration pneumonia         Assessment and Plan:      Summary of Stay:  patient is an 87-year-old male with advanced dementia who resides in long-term care at [facility], history also significant for traumatic brain injury, frequent falls, and type 2 diabetes who presented here with shortness of breath.  He was found to have bilateral pneumonia.  Patient actually had a choking on Friday requiring Heimlich maneuver.  On presentation, he was notably hypoxemic and chest x-ray demonstrated bilateral infiltrates.  Concerned for aspiration pneumonia.  Given his penicillin allergy, he was started on Levaquin and clindamycin appropriately.  Since presentation, he was placed on BiPAP but did have an episode of emesis so he was placed on high flow nasal cannula instead.  Notably anemic on presentation but no evidence of acute blood loss anemia.  He did have a Hemoccult positive stool but no evidence of acute blood loss anemia.  Could possibly be secondary to subacute GI or slowed bleed.  Patient was placed empirically on Protonix IV b.i.d.  Since admission he has received three units of blood.    Problem List/Plan:  1. Acute hypoxic respiratory failure: Secondary to aspiration pneumonia.  Still requiring high flow nasal cannula.  Patient did not tolerate BiPAP on presentation.    Continue Levaquin and clindamycin for now    Encourage pulmonary toileting with nasal trumpet suctioning p.r.n.    Scopolamine patch placed for secretions    Strict n.p.o.    2.  Acute on chronic anemia: Presumed slow to subacute GI bleed although no clear source of active bleeding at this point in time.  Status post  three units of packed RBC this admission.  Also suspect some bone marrow suppression secondary to acute illness.    Continue to monitor hemoglobin for now    Discussed with family and they would defer on further endoscopic procedures at this time.  Patient is too frail from a respiratory perspective and would need intubation if endoscopic procedures were to be pursued.  Supportive management for now.    Transfuse p.r.n. hemoglobin less than seven    Continue IV Protonix b.i.d.    3.  Fluids/electrolytes/nutrition:    Strict n.p.o. for now as patient is at high risk for aspiration    Considering nasal keofeed in the next day or two days if plans are still restorative although family seems to be leaning towards comfort measures and possible hospice at discharge    4.  Type 2 diabetes:    Resume medium intensity sliding scale n.p.o. protocol    5.  History of traumatic brain injury and severe end-stage dementia per daughter report.  Limited quality of life at this point in time    6.  Long-term goals:    I did meet with the patient's daughter today who is the power of  along with other family members.  They do understand his very guarded long-term prognosis especially if the decision remains restorative, he will need to be strict n.p.o. and other means of nutrition would have to be pursued.  They're clear that he does not want a PEG tube.  However, with his dementia, he will likely be pulling at lines and will need to be restrained.  This will certainly impact his quality of life.  They are interested in palliative care consult which has been ordered.      DVT Prophylaxis: Pneumatic Compression Devices  Code Status: DNR / DNI  Discharge Dispo: To be determined  Estimated Disch Date / # of Days until Disch: Anticipate 2-3 days depending on what goals of care's family decides on        Interval History (Subjective):      Limited historian secondary to dementia and lethargy         Physical Exam:      Vital  "signs:  Temp: 99.4  F (37.4  C) Temp src: Oral BP: (!) 87/55 Pulse: 69 Heart Rate: 69 Resp: 30 SpO2: 96 % O2 Device: High Flow Nasal Cannula (HFNC) Oxygen Delivery:  (25 lpm) Height: 167.6 cm (5' 6\") Weight: 75.8 kg (167 lb 1.7 oz)  Estimated body mass index is 26.97 kg/(m^2) as calculated from the following:    Height as of this encounter: 1.676 m (5' 6\").    Weight as of this encounter: 75.8 kg (167 lb 1.7 oz).      I/O last 3 completed shifts:  In: 2073.75 [I.V.:1773.75]  Out: -   Vitals:    07/28/17 2152 07/29/17 0315 07/30/17 0530   Weight: 74.8 kg (165 lb) 68 kg (149 lb 14.6 oz) 75.8 kg (167 lb 1.7 oz)       Constitutional:  sleeping but arousable with verbal stimuli.  Appears lethargic.  Notable mild respiratory distress    Respiratory:  increase work of breathing.  Coarse bilaterally.  Diminished breath sounds at the bases.     Cardiovascular: Regular rate and rhythm, normal S1 and S2, and no murmur noted   Abdomen: Normal bowel sounds, soft, non-distended, non-tender   Skin: No rashes, no cyanosis, dry to touch   Neuro:  mobilize all four extremities.  Otherwise unable    Extremities: No edema, normal range of motion   HEENT Normocephalic, atraumatic, normal nasal turbinates; oropharynx clear   Neck Supple; nl inspection; trachea midline; no thryomegaly   Psychiatric:  unable secondary to lethargy and dementia           Medications:      All current medications were reviewed with changes reflected in problem list.         Data:      All new lab and imaging data was reviewed.   Labs:    Recent Labs  Lab 07/30/17  0850 07/30/17  0525 07/29/17  1355 07/29/17  0814   WBC 7.1 7.7  --  8.6   HGB 8.1* 6.9* 8.2* 8.7*   HCT 26.7* 22.5*  --  28.0*   MCV 82 80  --  80    248  --  326       Recent Labs  Lab 07/30/17  0525 07/29/17  0814 07/28/17  2227    139 135   POTASSIUM 3.8 3.7 4.4   CHLORIDE 110* 106 103   CO2 22 22 21   ANIONGAP 8 11 11   GLC 83 157* 176*   BUN 20 16 17   CR 0.76 0.58* 0.58* "   GFRESTIMATED >90Non  GFR Calc >90Non  GFR Calc >90Non  GFR Calc   GFRESTBLACK >90African American GFR Calc >90African American GFR Calc >90African American GFR Calc   GLENNA 7.6* 7.5* 8.6   MAG 2.5* 1.9  --    PROTTOTAL  --  6.6* 7.6   ALBUMIN  --  3.0* 3.5   BILITOTAL  --  0.6 0.4   ALKPHOS  --  45 59   AST  --  16 20   ALT  --  15 15       Recent Labs  Lab 07/30/17  0850   INR 1.30*      Imaging:   No results found for this or any previous visit (from the past 24 hour(s)).    Ora Wan [phone]

## 2017-07-30 NOTE — PLAN OF CARE
Problem: Goal Outcome Summary  Goal: Goal Outcome Summary  ICU End of Shift Summary.  For vital signs and complete assessments, please see documentation flowsheets.      Pertinent assessments: Pt opens eyes to verbal stimuli, will yet out with turns but is cooperative.  Major Shift Events: SBP mostly in the 80's all night shift, tele hub aware, will monitor. HGM this am 6.9, tele hub aware.  Plan (Upcoming Events): Give 1 unit PRBC. Continue NS at 75, but hold during blood tx. Recheck hgm s/p bl tx.  Discharge/Transfer Needs: Palliative care conf Monday     Bedside Shift Report Completed :   Bedside Safety Check Completed:

## 2017-07-31 NOTE — CONSULTS
"[facility]    Palliative Care Consultation   Text Page    Date of Admission:  7/28/2017    Assessment & Plan   Darion Palacios is a 87 year old male who was admitted on 7/28/2017. I was asked to see the patient for goals of care.    Recommendations:  1. Dyspnea - oxygen - titrate for comfort. Fan at bedside prn. Position for comfort.  MS 5-10mg SL q 2 hours prn or 1-2mg IV q 1 hour prn dyspnea or pain.   2. Agitation - zyprexa 2.5 mg ODT q 12 hours prn, ativan 0.5-1mg  IV or SL q 3 hours prn. Appreciate nursing to offer essential oils, offer familiar routines that pt knows.  3. Pain - Position for comfort.  MS 5-10mg SL q 2 hours prn or 1-2mg IV q 1 hour prn dyspnea or pain. Tylenol 650mg [place] q 6 hours prn. Voltaren 1% gel to shoulders QID. Heating pad prn.  Goal of Care: DNR/DNI. No feeding tube. Comfort cares. Continue with antibiotics for today and reassess 8/1. Attempt to wean pt off HFNC to nasal canula or oximizer. If pt is able to discharge, will go to dtr's home with hospice. Work with ST to allow eating \"as safe as possible\".    Disease Process/es & Symptoms:  Darion Palacios is a 87 year old patient admitted with symptoms of Shortness of breath with increasing chest congestion. He has been treated for sepsis and respiratory failure due to aspiration pna, failing bipap, placed on HFNC, given antibiotics. Also treated for dysphagia, anemia, and chronic pain.    This is in the setting of progressive, advanced dementia, s/p TBI, DM2, GERD, depression, frequent falls, GIB with colonoscopy deferred by dtr.  The patient has moved to ICU from ED for higher level of care and HFNC, total assist with cares. Speech therapy has been consulted and observed dysphagia with evidence of aspiration.    The following symptoms are noted by patient as concerning to his quality of life as identified by family.  Pain - chronic pain s/p TBI. Unable to take usual pain meds. Pain seems to be in his shoulders " currently.  Dyspnea - currently on HFNC at 30% FIO2. Bilat PNA on antibiotics. Weak cough. Requiring suction and antimuscarinic medications to help with secretions.  Dysphagia - ST following with diet and liquid recommendations. Pt is not a candidate for TF per providers and family. High risk for reaspiration.  Agitation - baseline dementia. Appears to be picking at things and seeing things in the air. Pulling at tubes. Will be unable to take antiseizure medications.    Psychosocial/Spiritual Needs:  Pt is Pentecostalism. Family have requested  to come and annoint pt on Tuesday 8/1.  Oriented to Spiritual Health and Social Work Services as part of Palliative Care team.  is following. Consultation placed for  to follow.    Decision-Making & Goals of Care:  Discussion/counseling today about goals of care/decisions:   7/31/2017 Met with dtr / legal guardian Marcio, her  Cooper, her dtr Eunice, pt's other dtr Donita.  Family recount many happy stories about pt and their life [place], rides in the [employer] bus, pt as hard worker working 2 jobs, and beautiful [employer] at their childhood home. Marcio tells me that she would like to take pt home with her with hospice care. Providers have told the family that pt will likely have recurrent aspiration PNA due to swallowing issues from advanced dementia. They are fearful that pt will continue to have choking episodes and interested in anything that can help the pt with comfort eating. Marcio tells me that pt would not have wanted to be kept alive with machines and that several of her sisters were present this weekend at the hospital and aware of pt's condition.  states that he considers HFNC to be a machine. Marcio tells me pt would not want a feeding tube. Family understand that dysphagia, lack of appetite and recognition of hunger are part of the advanced dementia disease process. Family  "interested in conservative management of anemia \"can't we just hold his ASA?\" Discussed hospice philosophy, hospice criteria, hospice benefit, process for hospice information and enrollment and how the team might work with the family if they decide pt should go home with them at discharge. As discussion progressed through various possible scenarios, family asked about withdrawing current treatments as a potential option for pt and expressed fear that he was suffering due to hypoxia, secretions and confusion now.   Reconvened participants with Dr. Greco at 3:15pm. Dr. Greco summarized pt current status, and reviewed pt problem of dysphagia and because of wish for no feeding via tube, pt likely will not be able to sustain himself and even though antibiotics will help treat his PNA, he will likely not be able to fight the affects of the PNA due to weakness, deconditioning and lack of nutrition. Even if he does not eat, he is likely still aspirating. Reviewed option to withdraw treatment and make pt comfort care. Reviewed likely symptoms and treatments including medications for agitation, dyspnea and pain.  Family request to make pt comfort care, stop labs and monitoring, but continue with oxygen for comfort, continue antibiotics for today and reassess tomorrow. Allow pt to eat if he is awake and able to follow swallowing recommendations. Medications for agitation, confusion, pain and air hunger.  Attempt for nursing and RT to wean pt down off of his HFNC to NC or oximizer. If pt survives, could plan for discharge to home with hospice. Family informed that pt may be able to be transferred out of ICU during his stay if his symptoms are managed.  Family given information on dying process \"gone from my site\". Family request add'l assistance from [facility] to move up pt's annointing by  to tomorrow 8/1/2017.    Patient has decision-making capacity Unreliable  Patient has a court-appointed guardian/conservator for " "healthcare decisions in a legal document dated 6/22/2012. See name(s) and contact information in Health Care Agent/Legal Guardian section below.  Name: Marcio Mott, Relationship: dtr  See ACP section of pt's medical record for contact information.    Patient has a completed health care directive available in the chart (Y/N): N. Pt has living will but dtr is guardian and is attempting to locate document.  Physician orders for life-sustaining treatment (POLST) form indicates no aggressive treatment  Code Status: Do not resusitate / Do not intubate     Findings & plan of care discussed with: Dr. Greco, [facility], bedside RN,   and arely Harrison.  Follow-up plan from palliative team: Will continue to follow this pt for symptom management and support of family for goals of care.  Thank you for involving us in the patient's care.     Jia SANTOS, CNS  Pain Management and Palliative Care  [facility]  Pgr: [phone]    Time Spent on this Encounter   I spent  110 minutes in assessment of the patient and discussion with the patient and family. Another 30 minutes in review of chart, documentation and discussion with the health care team.  Care Conference 1:15-2:15, 3:15-3:45pm  Dtr Roxy Buckley,  and sister    Reason for Consult   Reason for consult: I was asked by Dr. Wagner to evaluate this patient for Goals of care.    Primary Care Physician   [facility]    Chief Complaint   Shortness of breath    History is obtained from the electronic health record, patient's daughter and patient's family    History of Present Illness   Darion \"Daniel\" CHELE Palacios is a 87 year old male who presents with shortness of breath and increasing chest congestion.  Pt has lived at [facility] for approximately 1 1/2 years due to advancing dementia with hx of TBI.  He is reported to have frequent falls, and balance issues.  His family reports that " "over the last 1 1/2 weeks the pt has had 3 choking spells requiring the heimlich maneuver by NH staff. He has been diagnosed with bilateral PNA, due to aspiration.  Pt has \"good days and bad days\" related to his cognitive fxn. He has had food consistency recommendations by the  at [facility]. Sometimes he needs to be fed, other times he is able to feed himself if he is set up to eat. He is unable to follow diet restrictions and swallowing precautions by himself. Family reports pt is able to sit up, he is no longer able to walk, incontinent of urine and stool, does not always recognize them or know their names, and some days does not speak, but other days speaks in full sentences. Family reports that he is living back in time.     Pt has a TBI related to an accident, followed by a long hospitalization that included ICU care and intubation/ventilation.    Past Medical History   I have reviewed this patient's medical history and updated it with pertinent information if needed.   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      Frequent falls      GERD (gastroesophageal reflux disease)      Glaucoma      Hearing loss      TBI (traumatic brain injury) (H)        Past Surgical History   I have reviewed this patient's surgical history and updated it with pertinent information if needed.  Past Surgical History:   Procedure Laterality Date     CHOLECYSTECTOMY  2004ish     VERTEBROPLASTY  2/2014       Prior to Admission Medications   Prior to Admission Medications   Prescriptions Last Dose Informant Patient Reported? Taking?   ASPIRIN PO 7/28/2017 at Unknown time  Yes Yes   Sig: Take 81 mg by mouth daily   Acetaminophen (TYLENOL PO) 7/28/2017 at Unknown time  Yes Yes   Sig: Take 1,000 mg by mouth 2 times daily "   GLIPIZIDE XL PO 2017 at Unknown time  Yes Yes   Sig: Take 5 mg by mouth every morning   METFORMIN HCL PO 2017 at Unknown time  Yes Yes   Sig: Take 500 mg by mouth 2 times daily (with meals)   SERTRALINE HCL PO 2017  Yes Yes   Sig: Take 100 mg by mouth every evening   bisacodyl (DULCOLAX) 10 MG suppository   No Yes   Sig: Place 1 suppository (10 mg) rectally daily as needed for constipation (IF Miralax ineffective)   cholecalciferol 1000 UNITS TABS 2017 at Unknown time  No Yes   Sig: Take 1,000 Units by mouth daily   divalproex (DEPAKOTE ER) 250 MG 24 hr tablet 2017  Yes Yes   Sig: Take 250 mg by mouth At Bedtime   latanoprost (XALATAN) 0.005 % ophthalmic solution 2017  Yes Yes   Sig: Place 1 drop into both eyes At Bedtime   timolol (TIMOPTIC) 0.5 % ophthalmic solution 2017 at Unknown time  Yes Yes   Sig: Place 1 drop into both eyes 2 times daily      Facility-Administered Medications: None     Allergies   Allergies   Allergen Reactions     Penicillins Anaphylaxis     Contrast Dye Hives       Social History   I have updated and reviewed the following Social History Narrative:   Social History     Social History Narrative    Marcio Mott -- court assigned guardian     Living situation: Lives at [facility]. His dtr states he will not be returning there and she would like to take him home if discharged. She is his guardian.  Family system: . Ex-wife reported to have mental illness and early dementia. Has 6 of 7 siblings alive. Son  1 1/2 years ago of acute leukemia. 1 dtr is estranged.  Functional status (needs help with ADLs or IADLs): Pt required assistance and cuing with ADLS, eating.  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  Use of community resources: PT and OT, but had stopped  recently.  Activities/interests: Loves to hunt, fish, garden  History of substance use/abuse: None.  Jain affiliation: Hoahaoism  Involvement in nery community: unable.  Impact of illness on patient: pt is totally dependent for cares. 3 episodes of aspiration in the last week and a half.    Family History   I have reviewed this patient's family history and updated it with pertinent information if needed.   No family history on file.    Review of Systems   C: NEGATIVE for fever, chills, change in weight  E/M: NEGATIVE for ear problems. POSITIVE for dysphagia and choking.  R: POSITIVE  for weak cough and SOB  CV: NEGATIVE for chest pain, palpitations or peripheral edema    Palliative Symptom Review (0=no symptom/no concern, 1=mild, 2=moderate, 3=severe):      Pain: 2-moderate      Fatigue: 1-mild      Nausea: 0-none      Constipation: 0-none      Diarrhea: 0-none      Depressive Symptoms: 0-none      Anxiety: 1-mild      Drowsiness: 0-none      Poor Appetite: 1-mild      Shortness of Breath: 2-moderate      Insomnia: 1-mild      Other:agitation  1-mild      Overall (0 good/no concerns, 3 very poor):  2    Physical Exam   Temp:  [98.1  F (36.7  C)-99.6  F (37.6  C)] 99  F (37.2  C)  Heart Rate:  [60-73] 60  Resp:  [11-37] 11  BP: ()/(47-84) 97/74  FiO2 (%):  [30 %] 30 %  SpO2:  [81 %-100 %] 98 %  169 lbs 8.54 oz  GEN:  Alert, disoriented x 3, appears restless, picking at thinks in the air.  HEENT:  Normocephalic/atraumatic, no scleral icterus, no nasal discharge, mouth moist.  CV: Irreg, irreg, S1, S2; no murmurs or other irregularities noted.  +2 DP/PT pulses bilatererally; no edema BLE. Feet cool to touch, pale.  RESP:  Coarse rales to auscultation bilaterally with some use of accessory muscles.  Symmetric chest rise on inhalation noted.  Labored respiratory effort at times.  ABD:  Rounded, soft, non-tender/non-distended.  +BS  EXT:  Edema & pulses as noted above.  CMS intact x 4.     M/S:   Deferred  palpation.  SKIN:  Dry to touch, no exanthems noted in the visualized areas.    NEURO: Symmetric strength +5/5.  Sensation to touch intact all extremities.   There is no area of allodynia or hyperesthesia.  Psych:  Confused affect.  Cooperative, limited verbal, inappropriate words, able to make needs known at times.    Delirium Screen/CAM:  Delirium = (#1 and #2 = YES) + (#3 and/or #4)   1) Acute onset and fluctuating course:   YES   (acute change in mental status from baseline over last 24 hours)  2) Inattention:   YES   (difficulty focusing, distractible, can't follow conversation)  3) Disorganized thinking:   No   (score only if #1 and #2 are YES)  (rambling/irrelevant conversation, unclear/illogical thoughts, inconsistency)  4) Altered level of consciousness:   YES   (score only if #1 and #2 are YES)  (other than alert, calm, cooperative)    Delirium/CAM score: 1,2,4/4  Interpretation:  1)  Delirium:  Present  2)  Type:  mixed  3)  Severity:  mild    Data   Results for orders placed or performed during the hospital encounter of 07/28/17 (from the past 24 hour(s))   Glucose by meter   Result Value Ref Range    Glucose 83 70 - 99 mg/dL   Glucose by meter   Result Value Ref Range    Glucose 77 70 - 99 mg/dL   CBC (AM Draw)   Result Value Ref Range    WBC 7.2 4.0 - 11.0 10e9/L    RBC Count 3.12 (L) 4.4 - 5.9 10e12/L    Hemoglobin 7.9 (L) 13.3 - 17.7 g/dL    Hematocrit 25.5 (L) 40.0 - 53.0 %    MCV 82 78 - 100 fl    MCH 25.3 (L) 26.5 - 33.0 pg    MCHC 31.0 (L) 31.5 - 36.5 g/dL    RDW 18.0 (H) 10.0 - 15.0 %    Platelet Count 258 150 - 450 10e9/L   Glucose by meter   Result Value Ref Range    Glucose 70 70 - 99 mg/dL   Glucose by meter   Result Value Ref Range    Glucose 65 (L) 70 - 99 mg/dL   Glucose by meter   Result Value Ref Range    Glucose 161 (H) 70 - 99 mg/dL   Glucose by meter   Result Value Ref Range    Glucose 88 70 - 99 mg/dL   Glucose by meter   Result Value Ref Range    Glucose 80 70 - 99 mg/dL   Glucose  by meter   Result Value Ref Range    Glucose 75 70 - 99 mg/dL   CBC (AM Draw)   Result Value Ref Range    WBC 7.5 4.0 - 11.0 10e9/L    RBC Count 3.27 (L) 4.4 - 5.9 10e12/L    Hemoglobin 8.1 (L) 13.3 - 17.7 g/dL    Hematocrit 26.8 (L) 40.0 - 53.0 %    MCV 82 78 - 100 fl    MCH 24.8 (L) 26.5 - 33.0 pg    MCHC 30.2 (L) 31.5 - 36.5 g/dL    RDW 18.3 (H) 10.0 - 15.0 %    Platelet Count 254 150 - 450 10e9/L   Basic metabolic panel   Result Value Ref Range    Sodium 138 133 - 144 mmol/L    Potassium 3.6 3.4 - 5.3 mmol/L    Chloride 108 94 - 109 mmol/L    Carbon Dioxide 25 20 - 32 mmol/L    Anion Gap 5 3 - 14 mmol/L    Glucose 85 70 - 99 mg/dL    Urea Nitrogen 21 7 - 30 mg/dL    Creatinine 0.67 0.66 - 1.25 mg/dL    GFR Estimate >90  Non  GFR Calc   >60 mL/min/1.7m2    GFR Estimate If Black >90   GFR Calc   >60 mL/min/1.7m2    Calcium 7.6 (L) 8.5 - 10.1 mg/dL

## 2017-07-31 NOTE — PROGRESS NOTES
[facility]  Hospitalist Progress Note  Name: Darion Palacios    MRN: [id number]  YOB: 1929    Age: 87 year old  Date of admission: 7/28/2017  Primary care provider: [facility]      Reason for Stay (Diagnosis): Acute hypoxic respiratory failure secondary to aspiration pneumonia         Assessment and Plan:      Summary of Stay:  patient is an 87-year-old male with advanced dementia who resides in long-term care at [facility], history also significant for traumatic brain injury, frequent falls, and type 2 diabetes who presented here with shortness of breath.  He was found to have bilateral pneumonia.  Patient actually had a choking on Friday requiring Heimlich maneuver.  On presentation, he was notably hypoxemic and chest x-ray demonstrated bilateral infiltrates.  Concerned for aspiration pneumonia.  Given his penicillin allergy, he was started on Levaquin and clindamycin appropriately.  Since presentation, he was placed on BiPAP but did have an episode of emesis so he was placed on high flow nasal cannula instead.  Notably anemic on presentation but no evidence of acute blood loss anemia.  He did have a Hemoccult positive stool but no evidence of acute blood loss anemia.  Could possibly be secondary to subacute GI or slowed bleed.  Patient was placed empirically on Protonix IV b.i.d.  Since admission he has received three units of blood.    Problem List/Plan:  1. Acute hypoxic respiratory failure: Secondary to aspiration pneumonia.  Still requiring high flow nasal cannula.  Patient did not tolerate BiPAP on presentation.    Continue Levaquin and clindamycin for now    Encourage pulmonary toileting with nasal trumpet suctioning p.r.n.    Scopolamine patch placed for secretions    Strict n.p.o.    2.  Acute on chronic anemia: Presumed slow to subacute GI bleed although no clear source of active bleeding at this point in time.  Status post  three units of packed RBC this admission.  Also suspect some bone marrow suppression secondary to acute illness.    Continue to monitor hemoglobin for now    Discussed with family and they would defer on further endoscopic procedures at this time.  Patient is too frail from a respiratory perspective and would need intubation if endoscopic procedures were to be pursued.  Supportive management for now.    Transfuse p.r.n. hemoglobin less than seven    Continue IV Protonix b.i.d.    3.  Fluids/electrolytes/nutrition:    Strict n.p.o. for now as patient is at high risk for aspiration    Considering nasal keofeed in the next day or two days if plans are still restorative although family seems to be leaning towards comfort measures and possible hospice at discharge    Failed swallow eval today    4.  Type 2 diabetes:    Resume medium intensity sliding scale n.p.o. protocol    5.  History of traumatic brain injury and severe end-stage dementia per daughter report.  Limited quality of life at this point in time    6.  Long-term goals:    Discussions regarding feeding and long term cares    Family considering palliative care    Family does not want PEG tubes      DVT Prophylaxis: Pneumatic Compression Devices  Code Status: DNR / DNI  Discharge Dispo: To be determined  Estimated Disch Date / # of Days until Disch: Anticipate 2-3 days depending on what goals of care's family decides on    ADDENDUM: 4pm     Family conference with maria c: Patient's advance directive per family was clear that he would not want to continue like this. He is made comfort cares. Palliative care will order meds. Will do comfort feeding and will continue antibiotic for now        Interval History (Subjective):      Limited historian secondary to dementia and lethargy. Tachypneic today unable to take PO. Pt confused         Physical Exam:      Vital signs:  Temp: 98.6  F (37  C) Temp src: Oral BP: 99/52   Heart Rate: 62 Resp: 18 SpO2: 99 % O2  "Device: High Flow Nasal Cannula (HFNC) Oxygen Delivery: Other (Comments) (25LPM) Height: 167.6 cm (5' 6\") Weight: 76.9 kg (169 lb 8.5 oz)  Estimated body mass index is 27.36 kg/(m^2) as calculated from the following:    Height as of this encounter: 1.676 m (5' 6\").    Weight as of this encounter: 76.9 kg (169 lb 8.5 oz).      I/O last 3 completed shifts:  In: 2076.25 [I.V.:1776.25]  Out: -   Vitals:    07/28/17 2152 07/29/17 0315 07/30/17 0530 07/31/17 0200   Weight: 74.8 kg (165 lb) 68 kg (149 lb 14.6 oz) 75.8 kg (167 lb 1.7 oz) 76.9 kg (169 lb 8.5 oz)       Constitutional:  sleeping but arousable with verbal stimuli.  Appears lethargic.  Notable mild respiratory distress    Respiratory:  increase work of breathing.  Coarse bilaterally.  Diminished breath sounds at the bases.     Cardiovascular: Regular rate and rhythm, normal S1 and S2, and + murmur noted   Abdomen: Normal bowel sounds, soft, non-distended, non-tender   Skin: No rashes, no cyanosis, dry to touch   Neuro:  mobilize all four extremities.  Otherwise unable    Extremities: No edema, normal range of motion   HEENT Normocephalic, atraumatic, normal nasal turbinates; oropharynx clear   Neck Supple; nl inspection; trachea midline; no thryomegaly   Psychiatric:  unable secondary to lethargy and dementia           Medications:      All current medications were reviewed with changes reflected in problem list.         Data:      All new lab and imaging data was reviewed.   Labs:    Recent Labs  Lab 07/31/17  0555 07/30/17  1825 07/30/17  0850   WBC 7.5 7.2 7.1   HGB 8.1* 7.9* 8.1*   HCT 26.8* 25.5* 26.7*   MCV 82 82 82    258 259       Recent Labs  Lab 07/31/17  0555 07/30/17  0525 07/29/17  0814 07/28/17  2227    140 139 135   POTASSIUM 3.6 3.8 3.7 4.4   CHLORIDE 108 110* 106 103   CO2 25 22 22 21   ANIONGAP 5 8 11 11   GLC 85 83 157* 176*   BUN 21 20 16 17   CR 0.67 0.76 0.58* 0.58*   GFRESTIMATED >90Non  GFR Calc >90Non  " American GFR Calc >90Non  GFR Calc >90Non  GFR Calc   GFRESTBLACK >90African American GFR Calc >90African American GFR Calc >90African American GFR Calc >90African American GFR Calc   GLENNA 7.6* 7.6* 7.5* 8.6   MAG  --  2.5* 1.9  --    PROTTOTAL  --   --  6.6* 7.6   ALBUMIN  --   --  3.0* 3.5   BILITOTAL  --   --  0.6 0.4   ALKPHOS  --   --  45 59   AST  --   --  16 20   ALT  --   --  15 15       Recent Labs  Lab 07/30/17  0850   INR 1.30*      Imaging:   No results found for this or any previous visit (from the past 24 hour(s)).

## 2017-07-31 NOTE — PROGRESS NOTES
Pt remains on HFNC this shift. Weaned flow to 20 lpm and Fio2 to 25%.  Tolerated well.  Trialed patient off on 4 lpm NC, buyt WOB increased, so placed back on HFNC.

## 2017-07-31 NOTE — PROGRESS NOTES
Clinical Bedside Swallow Evaluation  [facility] Inpatient  07/31/17 1200   General Information   Onset Date 07/28/17   Start of Care Date 07/31/17   Referring Physician Dr. Wagner   Patient Profile Review/OT: Additional Occupational Profile Info See Profile for full history and prior level of function   Patient/Family Goals Statement Family - to find out if pt can eat   Swallowing Evaluation Bedside swallow evaluation   Behaviorial Observations Alert;Confused   Mode of current nutrition NPO   Respiratory Status O2 Supply   Type of O2 supply (HFNC 25 LPM 30% O2)   Comments Pt is an 87-year-old male with advanced dementia who resides in LTC at [facility]. Hx also significant for TBI (2012), frequent falls, and DM2 who presented here with SOB.  Dx bilateral pneumonia.  Pt with recent choking episode on Friday (7/27) requiring Heimlich maneuver.  Since admission, pt placed on BiPAP but did have an episode of emesis so he was placed on high flow nasal cannula.  Also pt anemic on presentation but no evidence of acute blood loss anemia.  He did have a Hemoccult positive stool but no evidence of acute blood loss anemia.  Could possibly be secondary to subacute GI or slowed bleed.  Pt/family to have Palliative Care conference to discuss goals of care including nutrition. Swallow evaluation to determine if appropriate for PO, diet recommendations to further assist in decision making. Strict NPO since admission due to resp status, difficulty managing secretions and >30LPM on HFNC. Pt upright in bed for evaluation. Pleasant, cooperative. [place] impacting ability to follow directions as well as known dementia. Granddtr and dtr present. Granddtr assists with providing some hx - thinks pt choked on ground meat at NH. Pt has been on modified diet with increased difficulty tolerating thin liquids recently.    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      Frequent falls      GERD (gastroesophageal reflux disease)      Glaucoma      Hearing loss      TBI (traumatic brain injury) (H)        Clinical Swallow Evaluation   Oral Musculature generally intact;unable to assess due to poor participation/comprehension   Dentition present and adequate   Mucosal Quality good   Oral Musculature Comments Based on observation with PO, generally WFL other than inconsistent incoordination   Additional Documentation Yes   Clinical Swallow Eval: Thin Liquid Texture Trial   Mode of Presentation, Thin Liquids cup;fed by clinician;spoon   Volume of Liquid or Food Presented ice chip x2, tsp water, cup sip water   Oral Phase of Swallow Premature pharyngeal entry  (Incoordination)   Pharyngeal Phase of Swallow coughing/choking;wet vocal quality after swallow;reduction in laryngeal movement  (wet breathing; max cues to cough and able to bring up sputum)   Diagnostic Statement + s/sx of aspiration; wet vocal quality, wet inspirations   Clinical Swallow Eval: Nectar Thick Liquid Texture Trial   Mode of Presentation, Nectar cup;fed by clinician   Volume of Nectar Presented tsp sips    Oral Phase, Nectar (incoordination)   Pharyngeal Phase, Nectar other (see comments);reduction in laryngeal movement  (breathing changes - wet inspiration)   Diagnostic Statement Difficult to assess due to wet, congested breathing during and after PO trials. Complicated by HFNC and cognitive impairment.   Clinical Swallow Eval: Puree Solid Texture Trial   Mode of Presentation, Puree spoon;fed by clinician   Volume of Puree Presented applesauce x3 tsp   Oral Phase, Puree WFL   Pharyngeal Phase, Puree reduction in laryngeal movement;repeated swallows   Diagnostic Statement Difficult to assess due to wet, congested breathing during and after PO trials. Complicated by HFNC and cognitive  impairment.    General Therapy Interventions   Planned Therapy Interventions Dysphagia Treatment   Dysphagia treatment Modified diet education;Instruction of safe swallow strategies;Compensatory strategies for swallowing   Swallow Eval: Clinical Impressions   Skilled Criteria for Therapy Intervention Skilled criteria met.  Treatment indicated.   Functional Assessment Scale (FAS) 2   Dysphagia Outcome Severity Scale (WENDY) Level 2 - WENDY   Treatment Diagnosis Difficult to fully assess pharyngeal function at bedside given wet respirations - ? related to pna dx vs. aspiration of PO.   Diet texture recommendations NPO  (if pt/family choose to initiate PO, DD1 w nectar-thick)   Recommended Feeding/Eating Techniques alternate between small bites and sips of food/liquid;maintain upright posture during/after eating for 30 mins;no straws;small sips/bites   Therapy Frequency daily   Predicted Duration of Therapy Intervention (days/wks) x3 days   Anticipated Discharge Disposition extended care facility   Risks and Benefits of Treatment have been explained. Yes   Patient, family and/or staff in agreement with Plan of Care Yes   Clinical Impression Comments Palliative Care conference at 1:00 today - Could consider video swallow if this will assist with decision-making/change plan of care (TF vs PO). Suspect pt PO tolerance will wax/wane depending on respiratory status, ODALIS, cognitive impairment   Total Evaluation Time   Total Evaluation Time (Minutes) 10

## 2017-07-31 NOTE — PLAN OF CARE
Problem: Goal Outcome Summary  Goal: Goal Outcome Summary  Outcome: No Change  ICU End of Shift Summary.  For vital signs and complete assessments, please see documentation flowsheets.      Pertinent assessments: Patient remained extremely pleasant but confused overnight. Vitals stable on HFNC at 30% & 25LPM, however oxygen does desat when patient pulls off HFNC. Blood pressure soft but stable. NSR. No reports of pain or discomfort. Skin intact with blanchable erythema noted on coccyx. Patient needs help turning/repositioning every 2 hours, although is able to help reposition from side to side in bed. Patient is incontinent of urine, no bowel movement noted this shift. Frequent daily cares and barrier cream. Bed alarm remains on at all times. Patient easily redirectable.   Major Shift Events: Hgb stable. Blood glucose low, D50% given. Last check stable at 85. Patient appeared much stronger and more alert as shift progressed. Managing to cough up more of his secretions, however lungs still very coarse.   Plan (Upcoming Events): Family meeting today to discuss plans, state they will be here around 9am. Discuss need for change in IV fluids / nutrition / comfort options?  Discharge/Transfer Needs: TBD     Bedside Shift Report Completed :   Bedside Safety Check Completed:

## 2017-07-31 NOTE — PLAN OF CARE
Problem: Goal Outcome Summary  Goal: Goal Outcome Summary     SLP - Clinical bedside swallow evaluation completed. Family present. Granddtr thought pt choked on ground meat at NH PTA. Has been on somewhat modified diet for solids and most recently having increased cough with thin liquids.     Assessment/Recommendations:  1. Difficult to fully assess aspiration at bedside given wet respirations, cough - ? pna vs aspiration of PO  2. + s/sx of aspiration with thin liquids. Increased risk overall due to respiratory status, HFNC needs in setting of possible hx of aspiration and recent choking event  3. Palliative Care conference at 1:00 today - Could consider video swallow if this will assist with decision-making/change plan of care (TF vs PO). Suspect pt PO tolerance will wax/wane depending on respiratory status, ODALIS, cognitive impairment  4. Cont NPO until further family discussion. If plan to proceed with PO for pt quality of life and comfort, recommend Dysphagia Diet Level 1 (Puree) with NECTAR-thick liquids and short-term tx with SLP for adjustment/tolerance and further education.   5 SLP to follow    Discharge Planner SLP   Patient plan for discharge: Not stated  Current status: See above. + s/sx of aspiration with thin liquids. Recent choking event and hx of aspiration.  Barriers to return to prior living situation: Dysphagia, PO status  Recommendations for discharge: Pending Palliative Care conference  Rationale for recommendations: Pending Palliative Care conference       Entered by: Doroteo Rodriguez 07/31/2017 1:18 PM

## 2017-07-31 NOTE — PLAN OF CARE
Problem: Goal Outcome Summary  Goal: Goal Outcome Summary     SLP - Pt/family meeting with MD. Palliative Care conference with family at 1:00.

## 2017-07-31 NOTE — PROGRESS NOTES
SPIRITUAL HEALTH SERVICES Progress Note  [facility] ICU    Responded to Palliative Care Nurse's page regarding family's request about anointing the pt.  Pt's daughter Marcio and granddaughter Kassandra were present.  Marcio requested that the  that arely Baker contacted could come tomorrow instead of Wednesday.  I will refer family's request to Chaplain Baker first thing tomorrow.    Hunter Angulo M.Div., [facility]  Staff   Pager [phone]

## 2017-07-31 NOTE — PROGRESS NOTES
RT Note:    Patient remains on 30% FiO2 and 25 LPM HFNC.  SpO2 in mid 90s. Breath sounds are coarse.  RT will continue to monitor.    Staci Gonzáles  July 31, 2017.5:56 AM

## 2017-07-31 NOTE — PLAN OF CARE
Problem: Goal Outcome Summary  Goal: Goal Outcome Summary  ICU End of Shift Summary.  For vital signs and complete assessments, please see documentation flowsheets.      Pertinent assessments: oriented to self only, forgetful, calm & cooperative, restless at times, LS coarse on 20% HFNC or 4L NC, afebrile, CPOT neg for pain but voltaren gel applied to shoulders r/t rotator cuff injuries, NPO, oral swabs for moisture, incontinence brief in place  Major Shift Events: transition to comfort cares after family meeting with palliative this afternoon  Plan (Upcoming Events): wean off HFNC to oxymizer or NC as tolerated, keep comfortable  Discharge/Transfer Needs: home hospice if stable for transfer     Bedside Shift Report Completed : y  Bedside Safety Check Completed: nay

## 2017-07-31 NOTE — PROGRESS NOTES
Patient's blood glucose 65. 50ml Dextrose 50% given per MD order. Will continue to monitor closely.

## 2017-07-31 NOTE — PROGRESS NOTES
"SPIRITUAL HEALTH SERVICES  SPIRITUAL ASSESSMENT Progress Note  [facility] ICU 3rd floor    PRIMARY FOCUS:     Goals of care    Symptom/pain management    Emotional/spiritual/Yarsanism distress    Support for coping    ILLNESS CIRCUMSTANCES:   Reviewed documentation. Reflective conversation shared with family (dtr, Brodyboymarlin; dtr, Bethany; granddtr, Kassandra; son-in-law, Cooper) which integrated elements of illness and family narratives. Pt, \"Daniel\", due to hx of advanced dementia and TBI, was awake but did not participate in conversation other than occasional, spontaneous unrelated questions/statements.     Context of Serious Illness/Symptom(s) - Family shared that Daniel developed a chest cold about a week ago and developed increasing SOB now dx with pneumonia. Daniel has a hx of TBI from a fall about 6 years ago which significantly changed his mentation and behaviour (prior to this event pt was living independently and actively involved with family/community), eventually leading to family placing him in [facility]. Pt also suffers from advanced dementia.     Resources for Support - Family present as noted above. In addition there is another dtr Dasia (twin of Bethany). Pt is  and did not re-. Family shares that pt's Congreg[employer] nery was important to him as well.     DISTRESS:     Emotional/Existential/Relational Distress - Family notes feeling \"exhausted\" as primary care providers, particularly Marcio and her  Cooper. Family processed how much Daniel has changed with respect to his personality prior to his TBI 6 years ago and how that has been hard to witness as he had become more \"angry.\" Marcio notes that Daniel also became more open to sharing about his youth and growing up which she has also found to be a blessing.     Spiritual/Hindu Distress - None expressed.     Social/Cultural/Economic Distress - Family shares that the financial strain that the medical care has generated over the past 6 years has been heavy. " "    SPIRITUAL/Evangelical COPING:     Adventism/Maya - Daniel is Sikhism as is Marcio/Cooper. Bethany describes herself as \"Cy follower.\" None have been strongly connected to a Sikh the last few years. Daniel was a member of [employer] in [place] and this was their home Sikh as a family growing up.     Spiritual Practice(s) - Family shared that Daniel enjoyed the Sacraments of the Orthodoxy.     Emotional/Existential/Relational Connections - Family is very supportive of one another and were able to reflect both the burdens and blessings they have witnessed over the past 6 years together.   GOALS OF CARE:    Goals of Care - Family shares that they are thinking about ways \"to take Dad home and keep him comfortable.\" They share concerns around Daniel ability to \"eat safely\" as well as keeping him comfortable if he develops difficulty with breathing. Care conference is set for 1pm this afternoon with Palliative team to begin discerning goals of care.     Meaning/Sense-Making - Family engaged in episodic life review putting pt's medical events in the context of his larger life, sharing Daniel growing up in the depression era and how this was formative to him and the time he served during the [employer]. They also shared his love of fishing, music (\"Rat Pack\" being some of his favorites), dancing, and how wonderful a grandfather he was. Family also shared some of his \"Jimmyisms\" that developed after his TBI that they found humurous. Family supportive of one another and also use humor as a way of coping. Family shared the stress they have been under and the challenges that have been difficult. Processed this together and provided support, listening presence, validation/normalization of emotions, and  on self care.     PLAN: Will contact , per family request, for Sacrament of the Sick. Will continue to f/u with family offering emotional/spiritual support. I and the other chaplains remain available " per pt/family/staff need or request.       David Baker M.Div.  Staff   Pager [phone]

## 2017-08-01 NOTE — PROGRESS NOTES
"[facility]  Palliative Care Progress Note  Text Page     Assessment & Plan   Darion Palacios is a 87 year old male who was admitted on 7/28/2017. I was asked to see the patient for goals of care.     Recommendations:  1. Dyspnea - oxygen - titrate for comfort. Fan at bedside prn. Position for comfort.  MS 5-10mg SL q 2 hours prn or 1-2mg IV q 1 hour prn dyspnea or pain.   2. Agitation - zyprexa 2.5 mg ODT q 12 hours prn, ativan 0.5-1mg  IV or SL q 3 hours prn. Appreciate nursing to offer essential oils, offer familiar routines that pt knows.  3. Pain - Position for comfort.  MS 5-10mg SL q 2 hours prn or 1-2mg IV q 1 hour prn dyspnea or pain. Tylenol 650mg MI q 6 hours prn. Voltaren 1% gel to shoulders QID. Heating pad prn.  4. Dysphagia - family wishes for comfort eating as safe as possible. DD1 with nectar thick liquids for comfort.     Goal of Care: DNR/DNI. No feeding tube. Comfort cares. Wean pt off HFNC to nasal canula or oximizer. If pt is able to discharge, will go to dtr's home with hospice. Work with ST to allow eating \"as safe as possible\". Appreciate SWS to arrange meeting with  Hospice for 8/2/17.      Disease Process/es & Symptoms:  Darion Palacios is a 87 year old patient admitted with symptoms of Shortness of breath with increasing chest congestion. He has been treated for sepsis and respiratory failure due to aspiration pna, failing bipap, placed on HFNC, given antibiotics. Also treated for dysphagia, anemia, and chronic pain.     This is in the setting of progressive, advanced dementia, s/p TBI, DM2, GERD, depression, frequent falls, chronic  GIB with colonoscopy deferred by dtr.  The patient has moved to ICU from ED for higher level of care and HFNC, total assist with cares. Speech therapy has been consulted and observed dysphagia with evidence of aspiration.     The following symptoms are noted by patient as concerning to his quality of life as identified by family.  Pain - " chronic pain s/p TBI. Unable to take usual pain meds. Pain seems to be in his shoulders currently.  Dyspnea - currently on HFNC at 30% FIO2. Bilat PNA on antibiotics. Weak cough. Requiring suction and antimuscarinic medications to help with secretions.  Dysphagia - ST following with diet and liquid recommendations. Pt is not a candidate for TF per providers and family. High risk for reaspiration.  Agitation - baseline dementia. Appears to be picking at things and seeing things in the air. Pulling at tubes. Will be unable to take antiseizure medications.     Psychosocial/Spiritual Needs:  Pt is [employer]. Family have requested  to come and annoint pt on Wednesday 8/2.  Oriented to Spiritual Health and Social Work Services as part of Palliative Care team.  is following. Consultation placed for  to follow.     Decision-Making & Goals of Care:  Discussion/counseling today about goals of care/decisions:   8/1/2017 met with Marcio, Cooper, Eunice, Eunice's sister, and Donita. Answered questions about his symptoms and symptom management. They share stories and pic of pt with me. Have family dog in bed with pt and he appears comforted by this.  They ask about next stepsand Marcio verbalizes again that she wishes to take pt home with hospice.  I reviewed the process for his care and transfer out to the PCU from ICU, and hospice information meeting.  D/W SWS who will coordinate this meeting. Marcio and family are comfortable with this plan.  7/31/2017 Met with dtr / legal guardian Marcio, her  Cooper, her dtr Eunice, pt's other dtr Donita.  Family recount many happy stories about pt and their life [place], rides in the [employer] bus, pt as hard worker working 2 jobs, and beautiful [employer] at their childhood home. Marcio tells me that she would like to take pt home with her with hospice care. Providers have told the family that pt will likely  "have recurrent aspiration PNA due to swallowing issues from advanced dementia. They are fearful that pt will continue to have choking episodes and interested in anything that can help the pt with comfort eating. Marcio tells me that pt would not have wanted to be kept alive with machines and that several of her sisters were present this weekend at the hospital and aware of pt's condition.  states that he considers HFNC to be a machine. Marcio tells me pt would not want a feeding tube. Family understand that dysphagia, lack of appetite and recognition of hunger are part of the advanced dementia disease process. Family interested in conservative management of anemia \"can't we just hold his ASA?\" Discussed hospice philosophy, hospice criteria, hospice benefit, process for hospice information and enrollment and how the team might work with the family if they decide pt should go home with them at discharge. As discussion progressed through various possible scenarios, family asked about withdrawing current treatments as a potential option for pt and expressed fear that he was suffering due to hypoxia, secretions and confusion now.   Reconvened participants with Dr. Greco at 3:15pm. Dr. Greco summarized pt current status, and reviewed pt problem of dysphagia and because of wish for no feeding via tube, pt likely will not be able to sustain himself and even though antibiotics will help treat his PNA, he will likely not be able to fight the affects of the PNA due to weakness, deconditioning and lack of nutrition. Even if he does not eat, he is likely still aspirating. Reviewed option to withdraw treatment and make pt comfort care. Reviewed likely symptoms and treatments including medications for agitation, dyspnea and pain.  Family request to make pt comfort care, stop labs and monitoring, but continue with oxygen for comfort, continue antibiotics for today and reassess tomorrow. Allow pt to eat if he is awake " "and able to follow swallowing recommendations. Medications for agitation, confusion, pain and air hunger.  Attempt for nursing and RT to wean pt down off of his HFNC to NC or oximizer. If pt survives, could plan for discharge to home with hospice. Family informed that pt may be able to be transferred out of ICU during his stay if his symptoms are managed.  Family given information on dying process \"gone from my site\". Family request add'l assistance from Chaplains to move up pt's annointing by  to tomorrow 8/1/2017.     Patient has decision-making capacity Unreliable  Patient has a court-appointed guardian/conservator for healthcare decisions in a legal document dated 6/22/2012. See name(s) and contact information in Health Care Agent/Legal Guardian section below.  Name: Marcio Mott, Relationship: dtr  See ACP section of pt's medical record for contact information.     Patient has a completed health care directive available in the chart (Y/N): N. Pt has living will but dtr is guardian and is attempting to locate document.  Physician orders for life-sustaining treatment (POLST) form indicates no aggressive treatment  Code Status:                     Do not resusitate / Do not intubate      Findings & plan of care discussed with: Dr. Greco, [facility], bedside RN, Chaplain Hernandez and Chaplain Harrison.  Follow-up plan from palliative team: Will continue to follow this pt for symptom management and support of family for goals of care.  Thank you for involving us in the patient's care.       Jia SANTOS, CNS  Pain Management and Palliative Care  [facility]  Pgr: [phone]    Time Spent on this Encounter   I spent  25 minutes in assessment of the patient and discussion with the patient and family. Another 10 minutes in review of chart, documentation and discussion with the health care team.    Interval History   Pt still on HFNC at 20% FIO2. Awake. Says he feels \"awful\". Complaints " "of generalized stomach pains. Hungry, asking for coffee. Follows commands. Able to answer some questions.  arely Hernandez working to arrange  to anoint pt Wednesday.  Pt has many medications on hold due to swallowing including zoloft and depakote which dtr reports was given to pt for depression and mood stabilization after TBI.  Will continue to address medications for pt's comfort.    Review of Systems    C: NEGATIVE for chills, change in weight. POSITIVE for fever  E/M: NEGATIVE for ear problems. POSITIVE for dysphagia  R: POSITIVE for significant weak cough and occasional SOB.  CV: NEGATIVE for chest pain, palpitations or peripheral edema    Palliative Symptom Review (0=no symptom/no concern, 1=mild, 2=moderate, 3=severe):      Pain: 2-moderate complaints of abdominal pain \"all over\". Asking for coffee. Has chronic pain.      Fatigue: 1-mild      Nausea: 1-mild      Constipation: 0-none      Diarrhea: 0-none      Depressive Symptoms: 0-none  Currently not taking zoloft or depakote due to swallowing issue      Anxiety: 1-mild      Drowsiness: 0-none      Poor Appetite: 0-none      Shortness of Breath: 2-moderate      Insomnia: 0-none            Overall (0 good/no concerns, 3 very poor):  2    Physical Exam   Temp:  [96.9  F (36.1  C)-98.9  F (37.2  C)] 98.2  F (36.8  C)  Heart Rate:  [55-82] 82  Resp:  [11-29] 22  BP: ()/(55-87) 119/87  FiO2 (%):  [21 %-30 %] 21 %  SpO2:  [93 %-100 %] 100 %  169 lbs 8.54 oz  GEN:  Alert, oriented x 2 \"hospital\", appears comfortable, NAD.  HEENT:  Normocephalic/atraumatic, no scleral icterus, no nasal discharge, mouth dry.  CV:  RRR, S1, S2; no murmurs or other irregularities noted.  +2 DP/PT pulses bilatererally; no edema BLE.  RESP:  Coarse RH to auscultation bilaterally.  Symmetric chest rise on inhalation noted.  Sl labored respiratory effort.  ABD:  Rounded, soft, midline tenderness/sl distended.  +BS. Last BMs 7/29/2017  EXT:  Edema & pulses as noted above.  CMS " intact x 4.     M/S:   Tender to palpation - see abd above..    SKIN:  Dry to touch, no exanthems noted in the visualized areas.    NEURO: Symmetric strength +5/5.  Sensation to touch intact all extremities.   There is no area of allodynia or hyperesthesia.  PAIN BEHAVIOR: Cooperative  Psych:  Normal affect.  Calm, cooperative, conversant appropriately at times, other times non-verbal.    Medications     NaCl Stopped (07/31/17 1900)       diclofenac  2 g Transdermal 4x Daily     levofloxacin  500 mg Intravenous Q24H     clindamycin  900 mg Intravenous Q8H     pantoprazole  40 mg Intravenous BID     latanoprost  1 drop Both Eyes At Bedtime     timolol  1 drop Both Eyes BID     insulin aspart  1-6 Units Subcutaneous Q4H     valproate (DEPACON) intermittent infusion  250 mg Intravenous At Bedtime       Data   Results for orders placed or performed during the hospital encounter of 07/28/17 (from the past 24 hour(s))   Glucose by meter   Result Value Ref Range    Glucose 75 70 - 99 mg/dL   Glucose by meter   Result Value Ref Range    Glucose 74 70 - 99 mg/dL   Glucose by meter   Result Value Ref Range    Glucose 72 70 - 99 mg/dL   Glucose by meter   Result Value Ref Range    Glucose 89 70 - 99 mg/dL   Glucose by meter   Result Value Ref Range    Glucose 75 70 - 99 mg/dL

## 2017-08-01 NOTE — PLAN OF CARE
Problem: Goal Outcome Summary  Goal: Goal Outcome Summary  Outcome: No Change  ICU End of Shift Summary.  For vital signs and complete assessments, please see documentation flowsheets.      Pertinent assessments: pt remains on hf at 20l with 25% Fio2, pt confused at time per pt baseline with dementia. Pt incontinent x1.   Major Shift Events: no major events  Plan (Upcoming Events): readdress hospice and palliative, decrease need for high flow.  Discharge/Transfer Needs: wean hf, home with hospice     Bedside Shift Report Completed :   Bedside Safety Check Completed:

## 2017-08-01 NOTE — PROGRESS NOTES
SPIRITUAL HEALTH SERVICES Progress Note  [facility], [facility] ICU 3rd floor    SH f/u per plan of care. Spoke with pt's dtr, Marcio, outside of pt's room. Marcio shared that some family members were attending the  of a friend today and asked if we could arrange for pt's anointing for tomorrow (Wednesday) afternoon. Will coordinate this. (On-call  returns from out of town tomorrow morning and will call when he knows what time he will be able to perform Sacrament of the Sick.)   Marcio then engaged in brief review of the unfolding events over the last 24 hours, with regards to pt's care as well as life outside of the hospital. Marcio notes that her  has taken a leave of absence from work to help be a care provider for pt when he comes home.   SH will continue to follow for emotional/spiritual support.       LUZ Dawn.  Staff    Pager #[phone]

## 2017-08-01 NOTE — PROGRESS NOTES
RT-Pt remains on HFNC 20LPM, 25%. Pt tolerating ok. Bs coarse. Spo2 98%. RT will continue to follow.

## 2017-08-01 NOTE — PROGRESS NOTES
[facility]  Hospitalist Progress Note  Name: Darion Palacios    MRN: [id number]  YOB: 1929    Age: 87 year old  Date of admission: 7/28/2017  Primary care provider: [facility]      Reason for Stay (Diagnosis): Acute hypoxic respiratory failure secondary to aspiration pneumonia         Assessment and Plan:      Summary of Stay:  patient is an 87-year-old male with advanced dementia who resides in long-term care at [facility], history also significant for traumatic brain injury, frequent falls, and type 2 diabetes who presented here with shortness of breath.  He was found to have bilateral pneumonia.  Patient actually had a choking on Friday requiring Heimlich maneuver.  On presentation, he was notably hypoxemic and chest x-ray demonstrated bilateral infiltrates.  Concerned for aspiration pneumonia.  Given his penicillin allergy, he was started on Levaquin and clindamycin appropriately.  Since presentation, he was placed on BiPAP but did have an episode of emesis so he was placed on high flow nasal cannula instead.  Notably anemic on presentation but no evidence of acute blood loss anemia.  He did have a Hemoccult positive stool but no evidence of acute blood loss anemia.  Could possibly be secondary to subacute GI or slowed bleed.  Patient was placed empirically on Protonix IV b.i.d.  Since admission he has received four units of blood.     Given his co-morbidities, advance directive and significant dysphagia and risk for aspiration with no options for feeding (declinedpeg tube per directive) family expressed to choose route for comfort care. I and palliative care team met with family 7/31 and discussed prognosis. In light of patient's advance directive and work of breathing with pneumonia patient was made comfort care. Family understands and all questions were answered    Problem List/Plan:  1. Acute hypoxic respiratory failure: Secondary  "to aspiration pneumonia.  Still requiring high flow nasal cannula.  Patient did not tolerate BiPAP on presentation.    Continue Levaquin and clindamycin for now but can be discontinued if clinical decline is obvious. For now family would continue.    Scopolamine patch was replaced with rubinul for secretions    Comfort feeding is OK     Patient is now on comfort cares    Switch to nasal cannula    2.  Acute on chronic anemia: Presumed slow to subacute GI bleed although no clear source of active bleeding at this point in time.  Status post three units of packed RBC this admission.  Also suspect some bone marrow suppression secondary to acute illness.    Patient now on comfort cares    Will not do further blood draws or transfusions      3.  Fluids/electrolytes/nutrition:    Comfort feeding    Likely hospice at discharge    Failed swallow eval today    4.  Type 2 diabetes:    Resume medium intensity sliding scale     5.  History of traumatic brain injury and severe end-stage dementia per daughter report.  Limited quality of life at this point in time    6.  Long-term goals:    Discussions regarding feeding and long term cares    Family meeting 7/31 patient is now on comfort cares        DVT Prophylaxis: Pneumatic Compression Devices  Code Status: DNR / DNI  Discharge Dispo: To be determined  Estimated Disch Date / # of Days until Disch: Anticipate 1-2 days with hospice    Can be transferred out of ICU later today after  and family has a chance to see patient          Interval History (Subjective):      Limited historian secondary to dementia and lethargy.  Asking for coffee, communicating some,Tachypneic today unable to take PO.         Physical Exam:      Vital signs:  Temp: 96.9  F (36.1  C) Temp src: Axillary BP: 96/55   Heart Rate: 55 Resp: 22 SpO2: 98 % O2 Device: High Flow Nasal Cannula (HFNC) Oxygen Delivery: Other (Comments) (20LPM) Height: 167.6 cm (5' 6\") Weight: 76.9 kg (169 lb 8.5 oz)  Estimated " "body mass index is 27.36 kg/(m^2) as calculated from the following:    Height as of this encounter: 1.676 m (5' 6\").    Weight as of this encounter: 76.9 kg (169 lb 8.5 oz).      I/O last 3 completed shifts:  In: 450 [I.V.:450]  Out: -   Vitals:    07/28/17 2152 07/29/17 0315 07/30/17 0530 07/31/17 0200   Weight: 74.8 kg (165 lb) 68 kg (149 lb 14.6 oz) 75.8 kg (167 lb 1.7 oz) 76.9 kg (169 lb 8.5 oz)       Constitutional:  sleeping but arousable with verbal stimuli.  Appears lethargic.  Notable mild respiratory distress    Respiratory:  increase work of breathing.  Coarse bilaterally.  Diminished breath sounds at the bases.     Cardiovascular: Regular rate and rhythm, normal S1 and S2, and + murmur noted   Abdomen: Normal bowel sounds, soft, non-distended, non-tender   Skin: No rashes, no cyanosis, dry to touch   Neuro:  mobilize all four extremities.  Otherwise unable    Extremities: No edema, normal range of motion   HEENT Normocephalic, atraumatic, normal nasal turbinates; oropharynx clear   Neck Supple; nl inspection; trachea midline; no thryomegaly   Psychiatric:  unable secondary to lethargy and dementia           Medications:      All current medications were reviewed with changes reflected in problem list.         Data:      All new lab and imaging data was reviewed.   Labs:    Recent Labs  Lab 07/31/17  0555 07/30/17  1825 07/30/17  0850   WBC 7.5 7.2 7.1   HGB 8.1* 7.9* 8.1*   HCT 26.8* 25.5* 26.7*   MCV 82 82 82    258 259       Recent Labs  Lab 07/31/17  0555 07/30/17  0525 07/29/17  0814 07/28/17  2227    140 139 135   POTASSIUM 3.6 3.8 3.7 4.4   CHLORIDE 108 110* 106 103   CO2 25 22 22 21   ANIONGAP 5 8 11 11   GLC 85 83 157* 176*   BUN 21 20 16 17   CR 0.67 0.76 0.58* 0.58*   GFRESTIMATED >90Non  GFR Calc >90Non  GFR Calc >90Non  GFR Calc >90Non  GFR Calc   GFRESTBLACK >90African American GFR Calc >90African American GFR Calc " >90African American GFR Calc >90African American GFR Calc   GLENNA 7.6* 7.6* 7.5* 8.6   MAG  --  2.5* 1.9  --    PROTTOTAL  --   --  6.6* 7.6   ALBUMIN  --   --  3.0* 3.5   BILITOTAL  --   --  0.6 0.4   ALKPHOS  --   --  45 59   AST  --   --  16 20   ALT  --   --  15 15       Recent Labs  Lab 07/30/17  0850   INR 1.30*      Imaging:   No results found for this or any previous visit (from the past 24 hour(s)).

## 2017-08-01 NOTE — PLAN OF CARE
Problem: Goal Outcome Summary  Goal: Goal Outcome Summary  Outcome: No Change  ICU End of Shift Summary.  For vital signs and complete assessments, please see documentation flowsheets.      Pertinent assessments: Pt. Denied SOB. Generalized pain controlled with IV Morphine, ordered sublingual morphine from pharmacy per palliative's request. Irregular breathing with very coarse lung sounds. Sleeping on and off this shift. Incont. with urine x1. Very [place], gets startled if touched before awakened with voice. POC reviewed with family, education provided to help prepare for home cares. Family at bedside.    Major Shift Events: Changed from HiFlo O2 to 7LPM NC.   Plan (Upcoming Events): Transfer to floor out of ICU  Discharge/Transfer Needs: Transfer to floor, then poss. home with daughter on hospice     Bedside Shift Report Completed : yes  Bedside Safety Check Completed: yes

## 2017-08-01 NOTE — PROGRESS NOTES
SW spoke with pt's daughter and granddaughter to discuss hospice.  Daughter requests hospice meeting through [facility] for tomorrow 8/2.  Daughter states she is [facility] her [facility] and will be caring for him at home.   Daughter states she can provide 24 hour 7 day care for her dad in his last days.      Hospice meeting scheduled for 11 am 8/2.  Daughter notified and she states there will be other family members present as well for hospice meeting.

## 2017-08-01 NOTE — PROGRESS NOTES
NUTRITION BRIEF NOTE  RD following for positive nutrition risk screen on admit  Upon chart review, comfort care election. RD to sign off. Please page/consult as needed.      LAWRENCE Ya  3rd floor/ICU: [phone]  All other floors: [phone]  Weekend/holiday: [phone]  Office: [phone]

## 2017-08-02 NOTE — PLAN OF CARE
Problem: Goal Outcome Summary  Goal: Goal Outcome Summary  Outcome: No Change  ICU End of Shift Summary.  For vital signs and complete assessments, please see documentation flowsheets.      Pertinent assessments: confused, oriented to self, alert, LS coarse on RA and occasional o2 for comfort, no BM noted, incontinent of urine, skin intact, bed bath done, linen changed, medicated for comfort x 1.  Major Shift Events: Anointing of the sick done, Hospice meeting done, o2 weaned down/off, prescriptions sent to pharmacy.  Plan (Upcoming Events): Transfer to home/hospice on Friday  Discharge/Transfer Needs: Transfer to home/hospice on Friday     Bedside Shift Report Completed : yes  Bedside Safety Check Completed: yes

## 2017-08-02 NOTE — PROGRESS NOTES
[facility]  Hospice informational meeting completed with pt, 3 daughters, son-in-law and 2 granddaughters present. Hospice philosophy reviewed and pt and family in agreement with comfort approach and would like to proceed with home Hospice Care at time of discharge. Hospice to arrange for hospital bed with 4 side rails, OTB table, O2 with humidity and yonker suction to be delivered to daughter's home 8/3/17. Will request transportation be arranged for the morning of 8/4/17 and Hospice admission planned for 8/4/17 at 1300. Hospice will provide chux, depends and wipes for home use. POLST to be completed prior to discharge, Hospice comfort meds to be filled and sent with family at time of discharge. Will continue to follow until discharge.

## 2017-08-02 NOTE — PLAN OF CARE
Problem: Goal Outcome Summary  Goal: Goal Outcome Summary  Discharge Planner SLP   Patient plan for discharge: most likely hospice  Current status:   Pt in ICU.  Per MD he has had multiple choking events past few weeks.  He is on comfort care.  Safest strategies for providing pt with flavors he is craving is to sweep swab onto solid foods to get flavor and let him suck it off, or puree food and allow him to take tiny tastes such as 1/4 t.  He should be seated as high as possible with goal of 90 degrees upright to allow gravity to pull food into esophagus away from airway for all oral intake, even on swabs.  He should remain as upright as tolerable for 1 hour after oral intake for reflux precaution.  Avoid straws which may shoot liquid to far back into mouth or deplete energy sucking on them.  Present any liquid 1/4t using spoon, not cup drinking.  Excellent oral care will help patient feel more like taking food/liquids and will make him more comfortable. Alternate route for oral meds is recommended.  If he has meds that must be taken orally, crush and mix in applesauce and give in very small 1/4t amouts while pt as upright as possible.  There is no guarantee that patient will not overtly or silently aspirate any texture presented. SLP to sign off at this time per MD due to comfort care status.  Barriers to return to prior living situation: Medical condition, dysphagia  Recommendations for discharge: on comfort cares,discussed with MD  Rationale for recommendations: medical status       Entered by: Haile Arambula 08/02/2017 9:10 AM

## 2017-08-02 NOTE — PLAN OF CARE
Problem: Goal Outcome Summary  Goal: Goal Outcome Summary  Outcome: No Change  Problem: Goal Outcome Summary  Goal: Goal Outcome Summary  Outcome:   ICU End of Shift Summary.  For vital signs and complete assessments, please see documentation flowsheets.       Pertinent assessments:Generalized pain controlled with IV Morphine Irregular breathing with very coarse lung sounds.  Incont. With urine.   Major Shift Events: D5 .45 20k started for Blood sugar 68.  Plan (Upcoming Events): Transfer to floor out of ICU  Discharge/Transfer Needs: Transfer to floor, then poss. home with daughter on hospice      Bedside Shift Report Completed : yes  Bedside Safety Check Completed: yes

## 2017-08-02 NOTE — PROGRESS NOTES
[facility]  Hospitalist Progress Note  Name: Darion Palacios    MRN: [id number]  YOB: 1929    Age: 87 year old  Date of admission: 7/28/2017  Primary care provider: [facility]      Reason for Stay (Diagnosis): Acute hypoxic respiratory failure secondary to aspiration pneumonia         Assessment and Plan:      Summary of Stay:  patient is an 87-year-old male with advanced dementia who resides in long-term care at [facility], history also significant for traumatic brain injury, frequent falls, and type 2 diabetes who presented here with shortness of breath.  He was found to have bilateral pneumonia.  Patient actually had a choking on Friday requiring Heimlich maneuver.  On presentation, he was notably hypoxemic and chest x-ray demonstrated bilateral infiltrates.  Concerned for aspiration pneumonia.  Given his penicillin allergy, he was started on Levaquin and clindamycin appropriately.  Since presentation, he was placed on BiPAP but did have an episode of emesis so he was placed on high flow nasal cannula instead.  Notably anemic on presentation but no evidence of acute blood loss anemia.  He did have a Hemoccult positive stool but no evidence of acute blood loss anemia.  Could possibly be secondary to subacute GI or slowed bleed.  Patient was placed empirically on Protonix IV b.i.d.  Since admission he has received four units of blood.     Given his co-morbidities, advance directive and significant dysphagia and risk for aspiration with no options for feeding (declinedpeg tube per directive) family expressed to choose route for comfort care. I and palliative care team met with family 7/31 and discussed prognosis. In light of patient's advance directive and work of breathing with pneumonia.7/31 Patient was made comfort care. Family understands and all questions were answered    Problem List/Plan:  1. Acute hypoxic respiratory failure:  "Secondary to aspiration pneumonia.  was requiring high flow nasal cannula.  Patient did not tolerate BiPAP on presentation. Switched to nasal cannula for comfort    Continue Levaquin and clindamycin for now but can be discontinued if clinical decline is obvious. For now family would continue.    Scopolamine patch was replaced with rubinul for secretions    Comfort feeding is OK     Patient is now on comfort cares    Switch to nasal cannula    2.  Acute on chronic anemia: Presumed slow to subacute GI bleed although no clear source of active bleeding at this point in time.  Status post three units of packed RBC this admission.  Also suspect some bone marrow suppression secondary to acute illness.    Patient now on comfort cares    Will not do further blood draws or transfusions      3.  Fluids/electrolytes/nutrition:    Comfort feeding    Likely hospice at discharge    Failed swallow eval today    4.  Type 2 diabetes:    Not doing frequent fingerstick as pt on comfort care    5.  History of traumatic brain injury and severe end-stage dementia per daughter report.  Limited quality of life at this point in time    6.  Long-term goals:    Discussions regarding feeding and long term cares    Family meeting 7/31 patient is now on comfort cares        DVT Prophylaxis: Pneumatic Compression Devices  Code Status: DNR / DNI  Discharge Dispo: To be determined  Estimated Disch Date / # of Days until Disch: Anticipate Friday to home with hospice     Can be transferred out of ICU later today           Interval History (Subjective):      C/o feeling same as yesterday not too good.,Tachypneic today. Asks for coffee, using nasal cannula for breathing. Denies Chest pain         Physical Exam:      Vital signs:  Temp: 98  F (36.7  C) Temp src: Axillary BP: 95/57 Pulse: 72 Heart Rate: 62 Resp: 20 SpO2: 97 % O2 Device: Nasal cannula Oxygen Delivery: 4 LPM Height: 167.6 cm (5' 6\") Weight: 74.5 kg (164 lb 3.9 oz)  Estimated body mass index " "is 26.51 kg/(m^2) as calculated from the following:    Height as of this encounter: 1.676 m (5' 6\").    Weight as of this encounter: 74.5 kg (164 lb 3.9 oz).      I/O last 3 completed shifts:  In: 100 [I.V.:100]  Out: -   Vitals:    07/28/17 2152 07/29/17 0315 07/30/17 0530 07/31/17 0200   Weight: 74.8 kg (165 lb) 68 kg (149 lb 14.6 oz) 75.8 kg (167 lb 1.7 oz) 76.9 kg (169 lb 8.5 oz)    08/02/17 0347   Weight: 74.5 kg (164 lb 3.9 oz)       Constitutional:  awake.  Appears lethargic.  Notable mild respiratory distress    Respiratory:  increase work of breathing.  Coarse bilaterally.  Diminished breath sounds at the bases.     Cardiovascular: Regular rate and rhythm, normal S1 and S2, and + murmur noted   Abdomen: Normal bowel sounds, soft, non-distended, non-tender   Skin: No rashes, no cyanosis, dry to touch   Neuro:  mobilize all four extremities.  Otherwise unable    Extremities: No edema, normal range of motion   HEENT Normocephalic, atraumatic, normal nasal turbinates; oropharynx clear   Neck Supple; nl inspection; trachea midline; no thryomegaly   Psychiatric:  unable secondary to lethargy and dementia           Medications:      All current medications were reviewed with changes reflected in problem list.         Data:      All new lab and imaging data was reviewed.   Labs:    Recent Labs  Lab 07/31/17  0555 07/30/17  1825 07/30/17  0850   WBC 7.5 7.2 7.1   HGB 8.1* 7.9* 8.1*   HCT 26.8* 25.5* 26.7*   MCV 82 82 82    258 259       Recent Labs  Lab 07/31/17  0555 07/30/17  0525 07/29/17  0814 07/28/17  2227    140 139 135   POTASSIUM 3.6 3.8 3.7 4.4   CHLORIDE 108 110* 106 103   CO2 25 22 22 21   ANIONGAP 5 8 11 11   GLC 85 83 157* 176*   BUN 21 20 16 17   CR 0.67 0.76 0.58* 0.58*   GFRESTIMATED >90Non  GFR Calc >90Non  GFR Calc >90Non  GFR Calc >90Non  GFR Calc   GFRESTBLACK >90African American GFR Calc >90African American GFR Calc " >90African American GFR Calc >90African American GFR Calc   GLENNA 7.6* 7.6* 7.5* 8.6   MAG  --  2.5* 1.9  --    PROTTOTAL  --   --  6.6* 7.6   ALBUMIN  --   --  3.0* 3.5   BILITOTAL  --   --  0.6 0.4   ALKPHOS  --   --  45 59   AST  --   --  16 20   ALT  --   --  15 15       Recent Labs  Lab 07/30/17  0850   INR 1.30*      Imaging:   No results found for this or any previous visit (from the past 24 hour(s)).

## 2017-08-02 NOTE — PROGRESS NOTES
"[facility]  Palliative Care Progress Note  Text Page     Assessment & Plan   Darion Palacios is a 87 year old male who was admitted on 7/28/2017. I was asked to see the patient for goals of care.     Recommendations:  1. Dyspnea - oxygen - titrate for comfort. Fan at bedside prn. Position for comfort.  MS 5-10mg SL q 2 hours prn or 1-2mg IV q 2 hour prn dyspnea or pain.   2. Agitation - zyprexa 2.5 mg ODT q 12 hours prn, ativan 0.5-1mg  IV or SL q 3 hours prn. Appreciate nursing to offer essential oils, offer familiar routines that pt knows.  3. Pain - Position for comfort.  MS 5-10mg SL q 2 hours prn or 1-2mg IV q 2 hour prn dyspnea or pain. Tylenol 650mg WV q 6 hours prn. Voltaren 1% gel to shoulders QID. Heating pad prn.  4. Dysphagia - family wishes for comfort eating as safe as possible. DD1 with nectar thick liquids for comfort.  Appreciate precautions from ST today for pt and family for home.    Goal of Care: DNR/DNI. No feeding tube. Comfort cares. Pt will go to dtr's home with hospice. Work with ST to allow eating \"as safe as possible\". Appreciate SWS to arrange meeting with  Hospice for 8/2/17. Discharge Friday am when pt's dtr has airconditioning in place. Comfort meds ordered. Will complete POLST prior to DC.     Disease Process/es & Symptoms:  Darion Palacios is a 87 year old patient admitted with symptoms of Shortness of breath with increasing chest congestion. He has been treated for sepsis and respiratory failure due to aspiration pna, failing bipap, placed on HFNC, given antibiotics. Also treated for dysphagia, anemia, and chronic pain.     This is in the setting of progressive, advanced dementia, s/p TBI, DM2, GERD, depression, frequent falls, chronic  GIB with colonoscopy deferred by dtr.  The patient has moved to ICU from ED for higher level of care and HFNC, total assist with cares. Speech therapy has been consulted and observed dysphagia with evidence of aspiration.     The " "following symptoms are noted by patient as concerning to his quality of life as identified by family.  Pain - chronic pain s/p TBI. Unable to take usual pain meds. Pain seems to be in his shoulders currently.  Dyspnea - currently on HFNC at 30% FIO2. Bilat PNA on antibiotics. Weak cough. Requiring suction and antimuscarinic medications to help with secretions.  Dysphagia - ST following with diet and liquid recommendations. Pt is not a candidate for TF per providers and family. High risk for reaspiration.  Agitation - baseline dementia. Appears to be picking at things and seeing things in the air. Pulling at tubes. Will be unable to take antiseizure medications.     Psychosocial/Spiritual Needs:  Pt is [employer]ec[employer]al. Family have requested  to come and annoint pt on Wednesday 8/2.  Oriented to Spiritual Health and Social Work Services as part of Palliative Care team.  is following. Consultation placed for  to follow.     Decision-Making & Goals of Care:  Discussion/counseling today about goals of care/decisions:   8/2/2017 met with Cooper Buckley Marie and pt. With their permission, talked to pt and told him that his swallowing is not working and we can't fix it. His food and his saliva are going into his lungs some times and this is causing his choking and his aspiration. He has pneumonia from this and will likely keep getting pneumonias from this. He will likely pass away from this, and it is a normal part of the advanced disease of dementia that he has. His dtr and family have decided on his behalf to take him home to his dtr's house where he can be with his family who wish to care for him for the days that he has. We and they will have tools and medications to help with any discomfort that he has.  Pt appeared to listen to me with eye contact and then stared off and up to the right.  He said \"Its always better at home\". He appears to recognize his family. He is inconsistent with his " symptoms and responses and sometimes is totally non-verbal when I talk with him.  Family is agreeable to plan and planning to meet with hospice today at 11am. They are also expecting  to come and annoint pt this morning.   Dtr tells me that their air conditioning is out in their home and they want to get a unit installed before they take pt home. Would like to plan for Friday am. Have shared this information with Dr. Greco, ALFONSO Garrett and Hospice team.  Pt will likely transfer out of ICU today and family is aware. Comfort meds ordered for pt.  Will complete POLST with dtr prior to pt DC.  8/1/2017 met with Marcio, Cooper, Eunice, Eunice's sister, and Donita. Answered questions about his symptoms and symptom management. They share stories and pic of pt with me. Have family dog in bed with pt and he appears comforted by this.  They ask about next stepsand Marcio verbalizes again that she wishes to take pt home with hospice.  I reviewed the process for his care and transfer out to the PCU from ICU, and hospice information meeting.  D/W ALFONSO who will coordinate this meeting. Marcio and family are comfortable with this plan.  7/31/2017 Met with dtr / legal guardian Marcio, her  Cooper, her dtr Eunice, pt's other dtr Donita.  Family recount many happy stories about pt and their life [place], rides in the [employer] bus, pt as hard worker working 2 jobs, and beautiful [place] garden at their childhood home. Marcio tells me that she would like to take pt home with her with hospice care. Providers have told the family that pt will likely have recurrent aspiration PNA due to swallowing issues from advanced dementia. They are fearful that pt will continue to have choking episodes and interested in anything that can help the pt with comfort eating. Marcio tells me that pt would not have wanted to be kept alive with machines and that several of her sisters were present this weekend  "at the hospital and aware of pt's condition.  states that he considers HFNC to be a machine. Marcio tells me pt would not want a feeding tube. Family understand that dysphagia, lack of appetite and recognition of hunger are part of the advanced dementia disease process. Family interested in conservative management of anemia \"can't we just hold his ASA?\" Discussed hospice philosophy, hospice criteria, hospice benefit, process for hospice information and enrollment and how the team might work with the family if they decide pt should go home with them at discharge. As discussion progressed through various possible scenarios, family asked about withdrawing current treatments as a potential option for pt and expressed fear that he was suffering due to hypoxia, secretions and confusion now.   Reconvened participants with Dr. Greco at 3:15pm. Dr. Greco summarized pt current status, and reviewed pt problem of dysphagia and because of wish for no feeding via tube, pt likely will not be able to sustain himself and even though antibiotics will help treat his PNA, he will likely not be able to fight the affects of the PNA due to weakness, deconditioning and lack of nutrition. Even if he does not eat, he is likely still aspirating. Reviewed option to withdraw treatment and make pt comfort care. Reviewed likely symptoms and treatments including medications for agitation, dyspnea and pain.  Family request to make pt comfort care, stop labs and monitoring, but continue with oxygen for comfort, continue antibiotics for today and reassess tomorrow. Allow pt to eat if he is awake and able to follow swallowing recommendations. Medications for agitation, confusion, pain and air hunger.  Attempt for nursing and RT to wean pt down off of his HFNC to NC or oximizer. If pt survives, could plan for discharge to home with hospice. Family informed that pt may be able to be transferred out of ICU during his stay if his symptoms are " "managed.  Family given information on dying process \"gone from my site\". Family request add'l assistance from Chaplains to move up pt's annointing by  to tomorrow 8/1/2017.     Patient has decision-making capacity Unreliable  Patient has a court-appointed guardian/conservator for healthcare decisions in a legal document dated 6/22/2012. See name(s) and contact information in Health Care Agent/Legal Guardian section below.  Name: Marcio Mott, Relationship: dtr  See ACP section of pt's medical record for contact information.     Patient has a completed health care directive available in the chart (Y/N): N. Pt has living will but dtr is guardian and is attempting to locate document.  Physician orders for life-sustaining treatment (POLST) form indicates no aggressive treatment  Code Status:                     Do not resusitate / Do not intubate      Findings & plan of care discussed with: Dr. Greco, [facility], bedside RN.  Follow-up plan from palliative team: Will continue to follow this pt for symptom management and support of family for goals of care.  Thank you for involving us in the patient's care.       Jia SANTOS, CNS  Pain Management and Palliative Care  [facility]  Pgr: [phone]    Time Spent on this Encounter   I spent  25 minutes in assessment of the patient and discussion with the patient and family. Another 10 minutes in review of chart, documentation and discussion with the health care team.    Interval History   Pt on 2l of oxygen. Awake. Says he feels \"awful\". Then tells me he \"slept fantastic\". Complaints of generalized stomach pains. Hungry, asking for coffee. Follows commands. Able to answer some questions.   to anoint pt today.    Review of Systems    C: NEGATIVE for chills, change in weight, or fever  E/M: NEGATIVE for ear problems. POSITIVE for dysphagia  R: POSITIVE for significant weak cough and occasional SOB.  CV: NEGATIVE for chest pain, " "palpitations or peripheral edema    Palliative Symptom Review (0=no symptom/no concern, 1=mild, 2=moderate, 3=severe):      Pain: 2-moderate complaints of abdominal pain \"all over\" on PPI. Asking for coffee. Has chronic pain. Voltaren seems to help with shoulders.      Fatigue: 1-mild      Nausea: 0-none      Constipation: 0-none      Diarrhea: 0-none      Depressive Symptoms: 0-none  Currently not taking zoloft or depakote due to swallowing issue      Anxiety: 1-mild      Drowsiness: 0-none      Poor Appetite: 0-none      Shortness of Breath: 1-mild      Insomnia: 0-none      Dysphagia: 3-severe      Overall (0 good/no concerns, 3 very poor):  2    Physical Exam   Temp:  [98  F (36.7  C)-98.8  F (37.1  C)] 98  F (36.7  C)  Pulse:  [72] 72  Heart Rate:  [62] 62  Resp:  [20] 20  BP: ()/() 118/56  SpO2:  [92 %-100 %] 97 %  164 lbs 3.88 oz  GEN:  Alert, oriented x 2 \"hospital\", appears comfortable, NAD.  HEENT:  Normocephalic/atraumatic, no scleral icterus, no nasal discharge, mouth dry.  CV:  RRR, S1, S2; no murmurs or other irregularities noted.  +2 DP/PT pulses bilatererally; no edema BLE.  RESP:  Coarse RH to auscultation bilaterally. Slight audible rattle in throat with inspiration. Symmetric chest rise on inhalation noted.  Normal respiratory effort at rest.  ABD:  Rounded, soft, midline tenderness/sl distended.  +BS. Last BMs 7/29/2017  EXT:  Edema & pulses as noted above.  CMS intact x 4.     M/S:   Tender to palpation - see abd above..    SKIN:  Dry to touch, no exanthems noted in the visualized areas.    NEURO: Symmetric strength +5/5.  Sensation to touch intact all extremities.   There is no area of allodynia or hyperesthesia.  PAIN BEHAVIOR: Cooperative  Psych:  Normal affect.  Calm, cooperative, conversant appropriately at times, other times non-verbal.    Medications     dextrose 5% and 0.45% NaCl + KCl 20 mEq/L 75 mL/hr at 08/02/17 0329       diclofenac  2 g Transdermal 4x Daily     " levofloxacin  500 mg Intravenous Q24H     clindamycin  900 mg Intravenous Q8H     pantoprazole  40 mg Intravenous BID     latanoprost  1 drop Both Eyes At Bedtime     timolol  1 drop Both Eyes BID     insulin aspart  1-6 Units Subcutaneous Q4H     valproate (DEPACON) intermittent infusion  250 mg Intravenous At Bedtime       Data   Results for orders placed or performed during the hospital encounter of 07/28/17 (from the past 24 hour(s))   Glucose by meter   Result Value Ref Range    Glucose 74 70 - 99 mg/dL   Glucose by meter   Result Value Ref Range    Glucose 81 70 - 99 mg/dL   Glucose by meter   Result Value Ref Range    Glucose 68 (L) 70 - 99 mg/dL   Glucose by meter   Result Value Ref Range    Glucose 137 (H) 70 - 99 mg/dL   Glucose by meter   Result Value Ref Range    Glucose 76 70 - 99 mg/dL

## 2017-08-02 NOTE — PROGRESS NOTES
"SPIRITUAL HEALTH SERVICES Progress Note  [facility] ICU 3rd floor    SH f/u per plan of care. Met with family and pt this afternoon. Family shares that  had been in to anoint pt and provide Sacrament of the Sick. They noted that pt had participated in saying the \"Our Father\" prayer which he has not done in a long time and that this was comforting to them.   Family shared plan to d/c home with hospice on Friday. Currently pt appears comfortable and occasionally participates in conversation with short phrases. Pt's dog \"Adams\" is visiting and resting on bed with pt and all are very calm.   Provided support and listening presence as well as continued encouragement for self care.   Family very supportive of one another.   No formal plan to follow at this time. SH remains available per pt/family/staff need or request.       LUZ Dawn.  Staff    Pager #[phone]     "

## 2017-08-03 NOTE — PLAN OF CARE
Problem: Goal Outcome Summary  Goal: Goal Outcome Summary  Outcome: No Change  ICU End of Shift Summary.  For vital signs and complete assessments, please see documentation flowsheets.      Pertinent assessments: Alert to self, confused.VSS. Afebrile. LS: coarse. +BS. Incontinent of bladder.   Major Shift Events: Morphine given PRN for pain, new IV site established, slept on and off throughout the shift, turned and changed as needed  Plan (Upcoming Events): Home with Hospice on Friday  Discharge/Transfer Needs: Home with Hospice on Friday     Bedside Shift Report Completed : yes  Bedside Safety Check Completed: yes

## 2017-08-03 NOTE — PROGRESS NOTES
SWS:  SW following to assist with dc planning needs. Pt discharging home with daughter tomorrow with [facility]. [facility] will meet pt at daughters home at 1:00pm. Stretcher transport arranged and able to transport pt home at 11:00am. SW met with daughter and provided updated.  Daughter agreeable to plan.

## 2017-08-03 NOTE — PROGRESS NOTES
"[facility]  Palliative Care Progress Note  Text Page     Assessment & Plan   Darion Palacios is a 87 year old male who was admitted on 7/28/2017. I was asked to see the patient for goals of care.     Recommendations:  1. Dyspnea - oxygen - titrate for comfort. Fan at bedside prn. Position for comfort.  MS 5-10mg SL q 2 hours prn  dyspnea or pain.   2. Agitation - zyprexa 2.5 mg ODT q 12 hours prn, ativan 0.5-1mg  IV or SL q 3 hours prn. Appreciate nursing to offer essential oils, offer familiar routines that pt knows. Discussed with Dr Rueda 8/3 pros and cons of ordering depakote in a liquid form, however will send him home with haldol and ativan to help with agitation should it develop and not be controlled without depakote. D/W dtr.  3. Pain - Position for comfort.  MS 5-10mg SL q 2 hours prn dyspnea or pain. Tylenol 650mg IA q 6 hours prn. Voltaren 1% gel to shoulders QID. Heating pad prn.  4. Dysphagia - family wishes for comfort eating as safe as possible. DD1 with nectar thick liquids for comfort.  Appreciate precautions from ST  for pt and family for home.    Goal of Care: DNR/DNI. No feeding tube. Comfort cares. Pt will go to dtr's home with hospice. Work with ST to allow eating \"as safe as possible\". Discharge Friday am when pt's dtr has airconditioning in place. Comfort meds ordered. Completed POLST 8/3.     Disease Process/es & Symptoms:  Darion Palacios is a 87 year old patient admitted with symptoms of Shortness of breath with increasing chest congestion. He has been treated for sepsis and respiratory failure due to aspiration pna, failing bipap, placed on HFNC, given antibiotics. Also treated for dysphagia, anemia, and chronic pain.     This is in the setting of progressive, advanced dementia, s/p TBI, DM2, GERD, depression, frequent falls, chronic  GIB with colonoscopy deferred by dtr.  The patient has moved to ICU from ED for higher level of care and HFNC, total assist with cares. " Speech therapy has been consulted and observed dysphagia with evidence of aspiration.     The following symptoms are noted by patient as concerning to his quality of life as identified by family.  Pain - chronic pain s/p TBI. Unable to take usual pain meds. Pain seems to be in his shoulders currently.  Dyspnea - currently on HFNC at 30% FIO2. Bilat PNA on antibiotics. Weak cough. Requiring suction and antimuscarinic medications to help with secretions.  Dysphagia - ST following with diet and liquid recommendations. Pt is not a candidate for TF per providers and family. High risk for reaspiration.  Agitation - baseline dementia. Appears to be picking at things and seeing things in the air. Pulling at tubes. Will be unable to take antiseizure medications.     Psychosocial/Spiritual Needs:  Pt is Gnosticism.  annointed pt on Wednesday 8/2.  Oriented to Spiritual Health and Social Work Services as part of Palliative Care team.  is following. Consultation placed for  to follow.     Decision-Making & Goals of Care:  Discussion/counseling today about goals of care/decisions:   8/3/2017 met with Marcio. Discussed home arrangements to prepare for pt to arrive to her home. She reports many family helping and supportive and another sister will be taking ODALIS to assist with care of her dad. Reviewed some of the symptoms that her dad has or may display after discharge and how to control them, such as delirium and pain, what is likely to happen. Reinforce that hospice nurse will help with symptom management after discharge as well. Dtr is comfortable with this plan. POLST completed. Copies given to Dtr and on chart for d/c tomorrow.  8/2/2017 met with Cooper Buckley Marie and pt. With their permission, talked to pt and told him that his swallowing is not working and we can't fix it. His food and his saliva are going into his lungs some times and this is causing his choking and his aspiration. He  "has pneumonia from this and will likely keep getting pneumonias from this. He will likely pass away from this, and it is a normal part of the advanced disease of dementia that he has. His dtr and family have decided on his behalf to take him home to his dtr's house where he can be with his family who wish to care for him for the days that he has. We and they will have tools and medications to help with any discomfort that he has.  Pt appeared to listen to me with eye contact and then stared off and up to the right.  He said \"Its always better at home\". He appears to recognize his family. He is inconsistent with his symptoms and responses and sometimes is totally non-verbal when I talk with him.  Family is agreeable to plan and planning to meet with hospice today at 11am. They are also expecting  to come and annoint pt this morning.   Dtr tells me that their air conditioning is out in their home and they want to get a unit installed before they take pt home. Would like to plan for Friday am. Have shared this information with Dr. Greco, ALFONSO Garrett and Hospice team.  Pt will likely transfer out of ICU today and family is aware. Comfort meds ordered for pt.  Will complete POLST with dtr prior to pt DC.  8/1/2017 met with Cooper Buckley, Eunice, Eunice's sister, and Donita. Answered questions about his symptoms and symptom management. They share stories and pic of pt with me. Have family dog in bed with pt and he appears comforted by this.  They ask about next stepsand Marcio verbalizes again that she wishes to take pt home with hospice.  I reviewed the process for his care and transfer out to the PCU from ICU, and hospice information meeting.  D/W [facility] who will coordinate this meeting. Marcio and family are comfortable with this plan.  7/31/2017 Met with dtr / legal guardian Marcio, her  Cooper, her dtr Eunice, pt's other dtr Donita.  Family recount many happy stories about pt and their life growing up with " "[place], rides in the [employer] bus, pt as hard worker working 2 jobs, and beautiful [place] garden at their childhood home. Marcio tells me that she would like to take pt home with her with hospice care. Providers have told the family that pt will likely have recurrent aspiration PNA due to swallowing issues from advanced dementia. They are fearful that pt will continue to have choking episodes and interested in anything that can help the pt with comfort eating. Marcio tells me that pt would not have wanted to be kept alive with machines and that several of her sisters were present this weekend at the hospital and aware of pt's condition.  states that he considers HFNC to be a machine. Marcio tells me pt would not want a feeding tube. Family understand that dysphagia, lack of appetite and recognition of hunger are part of the advanced dementia disease process. Family interested in conservative management of anemia \"can't we just hold his ASA?\" Discussed hospice philosophy, hospice criteria, hospice benefit, process for hospice information and enrollment and how the team might work with the family if they decide pt should go home with them at discharge. As discussion progressed through various possible scenarios, family asked about withdrawing current treatments as a potential option for pt and expressed fear that he was suffering due to hypoxia, secretions and confusion now.   Reconvened participants with Dr. Greco at 3:15pm. Dr. Greco summarized pt current status, and reviewed pt problem of dysphagia and because of wish for no feeding via tube, pt likely will not be able to sustain himself and even though antibiotics will help treat his PNA, he will likely not be able to fight the affects of the PNA due to weakness, deconditioning and lack of nutrition. Even if he does not eat, he is likely still aspirating. Reviewed option to withdraw treatment and make pt comfort care. Reviewed " "likely symptoms and treatments including medications for agitation, dyspnea and pain.  Family request to make pt comfort care, stop labs and monitoring, but continue with oxygen for comfort, continue antibiotics for today and reassess tomorrow. Allow pt to eat if he is awake and able to follow swallowing recommendations. Medications for agitation, confusion, pain and air hunger.  Attempt for nursing and RT to wean pt down off of his HFNC to NC or oximizer. If pt survives, could plan for discharge to home with hospice. Family informed that pt may be able to be transferred out of ICU during his stay if his symptoms are managed.  Family given information on dying process \"gone from my site\". Family request add'l assistance from [facility] to move up pt's annointing by  to tomorrow 8/1/2017.     Patient has decision-making capacity Unreliable  Patient has a court-appointed guardian/conservator for healthcare decisions in a legal document dated 6/22/2012. See name(s) and contact information in Health Care Agent/Legal Guardian section below.  Name: Marcio Mott, Relationship: dtr  See ACP section of pt's medical record for contact information.     Patient has a completed health care directive available in the chart (Y/N): N. Pt has living will but dtr is guardian and is attempting to locate document.  Physician orders for life-sustaining treatment (POLST) form indicates no aggressive treatment  Code Status:                     Do not resusitate / Do not intubate      Findings & plan of care discussed with: Dr. Greco, [facility], bedside RN.  Follow-up plan from palliative team: Will continue to follow this pt for symptom management and support of family for goals of care.  Thank you for involving us in the patient's care.       Jia SANTOS, CNS  Pain Management and Palliative Care  [facility]  Pgr: [phone]    Time Spent on this Encounter   I spent  25 minutes in assessment of the " "patient and discussion with the patient and family. Another 10 minutes in review of chart, documentation and discussion with the health care team.    Interval History   Pt on 2l of oxygen. Awake.Tells me he is hungry. Family is swabbing coffee and other tastes that he likes, but pt started chewing swab. Appreciate ST recommendations for comfort eating as safe as possible.   Dtr clarifies that pt's TBI was from a fall down stairs at his son in laws and he \"face planted\". Took depakote for mood stabilization because he would get so angry at times and even throw things like chairs.       Review of Systems    C: NEGATIVE for chills, change in weight, or fever  E/M: NEGATIVE for ear problems. POSITIVE for dysphagia  R: POSITIVE for significant weak cough and occasional SOB.  CV: NEGATIVE for chest pain, palpitations or peripheral edema    Palliative Symptom Review (0=no symptom/no concern, 1=mild, 2=moderate, 3=severe):      Pain: 2-moderate complaints of abdominal pain \"all over\" on PPI. Asking for coffee. Has chronic pain. Voltaren seems to help with shoulders.      Fatigue: 1-mild      Nausea: 0-none      Constipation: 1-mild      Diarrhea: 0-none      Depressive Symptoms: 0-none  Currently not taking zoloft or depakote due to swallowing issue      Anxiety: 1-mild      Drowsiness: 0-none      Poor Appetite: 0-none      Shortness of Breath: 1-mild      Insomnia: 0-none      Dysphagia: 3-severe      Overall (0 good/no concerns, 3 very poor):  2    Physical Exam   Temp:  [98  F (36.7  C)] 98  F (36.7  C)  Pulse:  [68-72] 72  Heart Rate:  [70] 70  Resp:  [16-18] 18  BP: (100-112)/(48-55) 100/48  SpO2:  [92 %-100 %] 95 %  164 lbs 3.88 oz  GEN:  Alert, oriented x 2 \"hospital\", appears comfortable, NAD.  HEENT:  Normocephalic/atraumatic, no scleral icterus, no nasal discharge, mouth dry.  CV:  RRR, S1, S2; no murmurs or other irregularities noted.  +2 DP/PT pulses bilatererally; no edema BLE. Feet cool  RESP:  Coarse RH to " auscultation bilaterally. Slight audible rattle in throat with inspiration. Symmetric chest rise on inhalation noted.  Normal respiratory effort at rest.  ABD:  Rounded, soft, midline tenderness/sl distended.  +BS. Last BMs 7/29/2017  EXT:  Edema & pulses as noted above.  CMS intact x 4.     M/S:   Tender to palpation - see abd above..    SKIN:  Dry to touch, no exanthems noted in the visualized areas.    NEURO: Symmetric strength +5/5.  Sensation to touch intact all extremities.   There is no area of allodynia or hyperesthesia.  PAIN BEHAVIOR: Cooperative  Psych:  Normal affect.  Calm, cooperative, conversant appropriately at times, other times non-verbal.    Medications        diclofenac  2 g Transdermal 4x Daily     latanoprost  1 drop Both Eyes At Bedtime     timolol  1 drop Both Eyes BID     valproate (DEPACON) intermittent infusion  250 mg Intravenous At Bedtime       Data   No results found for this or any previous visit (from the past 24 hour(s)).

## 2017-08-03 NOTE — PROGRESS NOTES
[facility]    Hospitalist Progress Note  Name: Darion Palacios    MRN: [id number]  Provider:  Wei Rueda DO MPH  Date of Service: 08/03/2017    Summary of Stay:  patient is an 87-year-old male with advanced dementia who resides in long-term care at [facility], history also significant for traumatic brain injury, frequent falls, and type 2 diabetes who presented here with shortness of breath.  He was found to have bilateral pneumonia.  Patient actually had a choking on Friday requiring Heimlich maneuver.  On presentation, he was notably hypoxemic and chest x-ray demonstrated bilateral infiltrates.  Concerned for aspiration pneumonia.  Given his penicillin allergy, he was started on Levaquin and clindamycin appropriately.  Since presentation, he was placed on BiPAP but did have an episode of emesis so he was placed on high flow nasal cannula instead.  Notably anemic on presentation but no evidence of acute blood loss anemia.  He did have a Hemoccult positive stool but no evidence of acute blood loss anemia.  Could possibly be secondary to subacute GI or slowed bleed.  Patient was placed empirically on Protonix IV b.i.d.  Since admission he has received four units of blood.      Given his co-morbidities, advance directive and significant dysphagia and risk for aspiration with no options for feeding (declinedpeg tube per directive) family expressed to choose route for comfort care. Previous physicians and palliative care team met with family 7/31 and discussed prognosis. In light of patient's advance directive and work of breathing with pneumonia. On 7/31 patient was made comfort care. Family understands and all questions were answered     Problem List/Plan:  1.  Acute hypoxic respiratory failure: Secondary to aspiration pneumonia.  Was requiring high flow nasal cannula.  Patient did not tolerate BiPAP on presentation. Switched to nasal cannula for comfort.    Will stop IV clinda/levofloxacin given plans  for comfort care and completion of 5 days of abx    Scopolamine patch was replaced with rubinul for secretions    Comfort feeding is OK     Patient is now on comfort cares    Switch to nasal cannula and titrate to comfort     2.  Acute on chronic anemia: Presumed slow to subacute GI bleed although no clear source of active bleeding at this point in time.  Status post three units of packed RBC this admission.  Also suspect some bone marrow suppression secondary to acute illness.    Patient now on comfort cares    Will not do further blood draws or transfusions    Will stop IV PPI, would not give orally due to risk of pill aspiration      3.  Fluids/electrolytes/nutrition:    Comfort feeding    Likely hospice at discharge    Failed swallow eval recently    Stop IVF due to potential for fluid overload and now plans for comfort care     4.  Type 2 diabetes:    Not doing frequent fingerstick as pt on comfort care     5.  History of traumatic brain injury and severe end-stage dementia per daughter report:  Limited quality of life at this point in time. Stop IV depakote and would not give orally due to risk of pill aspiration.     6.  Long-term goals:    Had previous discussions regarding feeding and long term cares    Family meeting 7/31 patient is now on comfort cares     DVT Prophylaxis: Pneumatic Compression Devices  Code Status: DNR/DNI  Disposition: Expected discharge in 1 days to home hospice. Goals prior to discharge include arrange discharge plans.      Interval History   Assumed care from previous hospitalist. The history was fully reviewed.  Patient sleepy and limited hx. Discussed with RN.    -Data reviewed today: I reviewed all new labs and imaging results over the last 24 hours.     Physical Exam   Temp: 98  F (36.7  C) Temp src: Axillary BP: 100/48 Pulse: 72 Heart Rate: 70 Resp: 18 SpO2: 95 % O2 Device: None (Room air) Oxygen Delivery: 1 LPM  Vitals:    07/30/17 0530 07/31/17 0200 08/02/17 0347   Weight:  75.8 kg (167 lb 1.7 oz) 76.9 kg (169 lb 8.5 oz) 74.5 kg (164 lb 3.9 oz)     Vital Signs with Ranges  Temp:  [98  F (36.7  C)] 98  F (36.7  C)  Pulse:  [68-72] 72  Heart Rate:  [70] 70  Resp:  [16-18] 18  BP: (100-112)/(48-55) 100/48  SpO2:  [92 %-100 %] 95 %  I/O last 3 completed shifts:  In: 250 [I.V.:250]  Out: -     GENERAL: No apparent distress. Awake, alert. Mild respiratory distress, chronically ill.  HEENT: Normocephalic, atraumatic. Extraocular movements intact.  CARDIOVASCULAR: Regular rate and rhythm without murmurs or rubs. No S3.  PULMONARY: Coarse bilaterally.  GASTROINTESTINAL: Soft, non-tender, non-distended. Bowel sounds normoactive.   EXTREMITIES: No cyanosis or clubbing. 1+ BL LE edema.  NEUROLOGICAL: CN 2-12 grossly intact, no focal neurological deficits.  DERMATOLOGICAL: No rash, ulcer, nor jaundice. Some bruising throughout extremities.    Medications        diclofenac  2 g Transdermal 4x Daily     latanoprost  1 drop Both Eyes At Bedtime     timolol  1 drop Both Eyes BID     valproate (DEPACON) intermittent infusion  250 mg Intravenous At Bedtime     Data     Laboratory:    Recent Labs  Lab 07/31/17  0555 07/30/17  1825 07/30/17  0850   WBC 7.5 7.2 7.1   HGB 8.1* 7.9* 8.1*   HCT 26.8* 25.5* 26.7*   MCV 82 82 82    258 259       Recent Labs  Lab 07/31/17  0555 07/30/17  0525 07/29/17  0814    140 139   POTASSIUM 3.6 3.8 3.7   CHLORIDE 108 110* 106   CO2 25 22 22   ANIONGAP 5 8 11   GLC 85 83 157*   BUN 21 20 16   CR 0.67 0.76 0.58*   GFRESTIMATED >90Non  GFR Calc >90Non  GFR Calc >90Non  GFR Calc   GFRESTBLACK >90African American GFR Calc >90African American GFR Calc >90African American GFR Calc   GLENNA 7.6* 7.6* 7.5*       Recent Labs  Lab 07/29/17  1400 07/29/17  1355   CULT No growth after 4 days No growth after 4 days       Imaging:  No results found for this or any previous visit (from the past 24 hour(s)).      Wei Rueda DO  [facility]  Formerly [facility] Hospitalist  [address] [place] [address]  Pager: [phone]  08/03/2017

## 2017-08-04 NOTE — PLAN OF CARE
Problem: Goal Outcome Summary  Goal: Goal Outcome Summary  Outcome: No Change  .ICU End of Shift Summary.  For vital signs and complete assessments, please see documentation flowsheets.      Pertinent assessments: Patient alert to self; confused.  Patient is on comfort cares.  On 2 L of O2 for comfort.  LS coarse; infrequent cough, productive at times.  PRN atropine drops given to help with secretions.  SL Morphine given for signs of pain.  Sleeping between cares.    Major Shift Events: Patient had 5 episodes of bowel movements ranging from soft, watery, and most recent one was soft and formed.    Plan (Upcoming Events): Discharge to home today with home hospice.  Transportation scheduled for 1100.    Discharge/Transfer Needs: Home with hospice.       Bedside Shift Report Completed :   Bedside Safety Check Completed:

## 2017-08-04 NOTE — DISCHARGE SUMMARY
Hospitalist Discharge Summary    Darion Palacios MRN# [id number]   YOB: 1929 Age: 87 year old     Date of Admission:  7/28/2017  Date of Discharge:  8/4/2017 11:40 AM  Admitting Physician:  Liseth Wagner MD  Discharge Physician:  Wei Rueda  Discharging Service:  Hospitalist     Primary Provider: [facility]          Discharge Diagnosis:   Acute hypoxic respiratory failure due to aspiration PNA  Acute on chronic anemia  DM  Deconditioning  Severe protein calorie malnutrition  Severe dysphagia  Hx TBI             Discharge Disposition:   Discharged to home hospice           Allergies:   Allergies   Allergen Reactions     Penicillins Anaphylaxis     Contrast Dye Hives              Discharge Medications:   Current Discharge Medication List      START taking these medications    Details   morphine sulfate HIGH CONCENTRATE (ROXANOL *CONCENTRATED*) 100 MG/5ML (HIGH CONC) solution Place 0.25-0.5 mLs (5-10 mg) under the tongue every 3 hours as needed for shortness of breath / dyspnea or moderate to severe pain  Qty: 15 mL, Refills: 0    Associated Diagnoses: End of life care      acetaminophen (TYLENOL) 650 MG Suppository Place 1 suppository (650 mg) rectally every 4 hours as needed for fever or mild pain  Qty: 12 suppository, Refills: 0    Associated Diagnoses: End of life care      ondansetron (ZOFRAN-ODT) 4 MG ODT tab Take 1 tablet (4 mg) by mouth every 6 hours as needed for nausea or vomiting  Qty: 5 tablet, Refills: 0    Associated Diagnoses: End of life care      diclofenac (VOLTAREN) 1 % GEL topical gel Place 2 g onto the skin 4 times daily shoulders  Qty: 1 Tube, Refills: 0    Comments: Please obtain tube from pt's inpatient room and label for discharge. Do not send a new tube.  Associated Diagnoses: End of life care      mineral oil-hydrophilic petrolatum (AQUAPHOR) Apply topically every 8 hours as needed for dry skin  Qty: 50 g, Refills: 0     Comments: Please label and send pt's inpatient tube or jar if he has one.  Associated Diagnoses: End of life care      MEDICATION INSTRUCTION If care facility cannot accept or use ranges, facility is instructed to use lower end of dosing range  Refills: 0    Associated Diagnoses: End of life care      atropine 1 % ophthalmic solution Take 2-4 drops by mouth, place under tongue or place inside cheek every 2 hours as needed for other (terminal respiratory secretions) Not for ophthalmic use.  Qty: 5 mL, Refills: 0    Associated Diagnoses: End of life care      LORazepam (ATIVAN) 2 MG/ML (HIGH CONC) solution Take 0.13-0.25 mLs (0.26-0.5 mg) by mouth, place under tongue or insert rectally every 4 hours as needed for anxiety (restlessness)  Qty: 30 mL, Refills: 0    Associated Diagnoses: End of life care      haloperidol (HALDOL) 2 MG/ML (HIGH CONC) solution Take 0.25-0.5 mLs (0.5-1 mg) by mouth, place under tongue or insert rectally every 6 hours as needed for agitation (nausea)  Qty: 15 mL, Refills: 0    Associated Diagnoses: End of life care         CONTINUE these medications which have NOT CHANGED    Details   latanoprost (XALATAN) 0.005 % ophthalmic solution Place 1 drop into both eyes At Bedtime      timolol (TIMOPTIC) 0.5 % ophthalmic solution Place 1 drop into both eyes 2 times daily      bisacodyl (DULCOLAX) 10 MG suppository Place 1 suppository (10 mg) rectally daily as needed for constipation (IF Miralax ineffective)  Qty: 30 suppository    Associated Diagnoses: Constipation         STOP taking these medications       divalproex (DEPAKOTE ER) 250 MG 24 hr tablet Comments:   Reason for Stopping:         GLIPIZIDE XL PO Comments:   Reason for Stopping:         METFORMIN HCL PO Comments:   Reason for Stopping:         SERTRALINE HCL PO Comments:   Reason for Stopping:         Acetaminophen (TYLENOL PO) Comments:   Reason for Stopping:         cholecalciferol 1000 UNITS TABS Comments:   Reason for Stopping:          "ASPIRIN PO Comments:   Reason for Stopping:                      Condition on Discharge:   Discharge condition: Poor   Discharge vitals: Blood pressure 100/48, pulse 72, temperature 98.7  F (37.1  C), temperature source Axillary, resp. rate 20, height 1.676 m (5' 6\"), weight 74.5 kg (164 lb 3.9 oz), SpO2 95 %.   Code status on discharge: Comfort Care      BASIC PHYSICAL EXAMINATION:  GENERAL: No apparent distress. Awake, alert. Mild respiratory distress, chronically ill.  HEENT: Normocephalic, atraumatic. Extraocular movements intact.  CARDIOVASCULAR: Regular rate and rhythm without murmurs or rubs. No S3.  PULMONARY: Coarse bilaterally.  GASTROINTESTINAL: Soft, non-tender, non-distended. Bowel sounds normoactive.   EXTREMITIES: No cyanosis or clubbing. 1+ BL LE edema.  NEUROLOGICAL: CN 2-12 grossly intact, no focal neurological deficits.  DERMATOLOGICAL: No rash, ulcer, nor jaundice. Some bruising throughout extremities.         History of Illness:   See detailed admission note for full details.               Procedures excluding imaging which is summarized below:   See EMR           Consultations:   PHARMACY TO DOSE TOBRAMYCIN  SPEECH LANGUAGE PATH ADULT IP CONSULT  SPEECH LANGUAGE PATH ADULT IP CONSULT  PALLIATIVE CARE ADULT IP CONSULT          Significant Results:   Results for orders placed or performed during the hospital encounter of 07/28/17   XR Chest 2 Views    Narrative    CHEST 2 VIEWS  7/28/2017 11:55 PM     HISTORY: Hypoxia.    COMPARISON: 8/5/2014.    FINDINGS: Flattening of the diaphragm on the lateral projection image,  suggestive of chronic obstructive pulmonary disease. A few hazy  opacities in bilateral lung bases. The lungs are otherwise clear.  Possible cardiomegaly. Atherosclerotic calcification in the thoracic  aorta. Moderate to large hiatal hernia again noted.      Impression    IMPRESSION:  1. A few hazy opacities in bilateral lung bases are nonspecific and  could represent atelectasis " and/or pneumonia.  2. No other findings suspicious for active cardiopulmonary disease.  3. Probable chronic obstructive pulmonary disease.    GARO JUÁREZ MD                Pending Results:   Unresulted Labs Ordered in the Past 30 Days of this Admission     No orders found from 5/29/2017 to 7/29/2017.                        Discharge Instructions and Follow-Up:   Discharge instructions and follow-up:   Discharge Procedure Orders  Follow-up and recommended labs and tests    Order Comments: Follow up with home hospice.     Activity   Order Comments: Your activity upon discharge: activity as tolerated   Order Specific Question Answer Comments   Is discharge order? Yes      DNR/DNI     Oxygen Adult   Order Comments: New Home Oxygen Order 2 liter(s) by nasal cannula continuously with use of portable tank. Expected treatment length is 1 months.. Test on conserving device as applicable.    Patients who qualify for home O2 coverage under the CMS guidelines require ABG tests or O2 sat readings obtained closest to, but no earlier than 2 days prior to the discharge, as evidence of the need for home oxygen therapy. Testing must be performed while patient is in the chronic stable state. See notes for O2 sats.    I certify that this patient, Darion Palacios has been under my care and that I, or a nurse practitioner or physician's assistant working with me, had a face-to-face encounter that meets the face-to-face encounter requirements with this patient on 8/4/2017. The patient, Darion Palacios was evaluated or treated in whole, or in part, for the following medical condition, which necessitates the use of the ordered oxygen. Treatment Diagnosis: PNA    Attending Provider: Wei Rueda  Physician signature: See electronic signature associated with these discharge orders  Date of Order: August 4, 2017     Diet   Order Comments: Follow this diet upon discharge: Orders Placed This Encounter     Dysphagia Diet Level 1  Pureed Nectar Thickened Liquids (pre-thickened or use instant food thickener), comfort eating if desired.   Order Specific Question Answer Comments   Is discharge order? Yes                Hospital Course:   Summary of Stay:  patient is an 87-year-old male with advanced dementia who resides in long-term care at [facility], history also significant for traumatic brain injury, frequent falls, and type 2 diabetes who presented here with shortness of breath.  He was found to have bilateral pneumonia.  Patient actually had a choking on Friday requiring Heimlich maneuver.  On presentation, he was notably hypoxemic and chest x-ray demonstrated bilateral infiltrates.  Concerned for aspiration pneumonia.  Given his penicillin allergy, he was started on Levaquin and clindamycin appropriately.  Since presentation, he was placed on BiPAP but did have an episode of emesis so he was placed on high flow nasal cannula instead.  Notably anemic on presentation but no evidence of acute blood loss anemia.  He did have a Hemoccult positive stool but no evidence of acute blood loss anemia.  Could possibly be secondary to subacute GI or slowed bleed.  Patient was placed empirically on Protonix IV b.i.d.  Since admission he has received four units of blood.       Given his co-morbidities, advance directive and significant dysphagia and risk for aspiration with no options for feeding (declinedpeg tube per directive) family expressed to choose route for comfort care. Previous physicians and palliative care team met with family 7/31 and discussed prognosis. In light of patient's advance directive and work of breathing with pneumonia. On 7/31 patient was made comfort care. Family understands and all questions were answered      Problem List/Plan:  1.  Acute hypoxic respiratory failure: Secondary to aspiration pneumonia.  Was requiring high flow nasal cannula.  Patient did not tolerate BiPAP on presentation. Switched to nasal cannula for  comfort.    Will stop IV clinda/levofloxacin given plans for comfort care and completion of 5 days of abx    Scopolamine patch was replaced with rubinul for secretions    Comfort feeding is OK     Patient is now on comfort cares    Switch to nasal cannula and titrate to comfort      2.  Acute on chronic anemia: Presumed slow to subacute GI bleed although no clear source of active bleeding at this point in time.  Status post three units of packed RBC this admission.  Also suspect some bone marrow suppression secondary to acute illness.    Patient now on comfort cares    Will not do further blood draws or transfusions    Will stop IV PPI, would not give orally due to risk of pill aspiration       3.  Fluids/electrolytes/nutrition:    Comfort feeding    Likely hospice at discharge    Failed swallow eval recently    Stop IVF due to potential for fluid overload and now plans for comfort care      4.  Type 2 diabetes:    Not doing frequent fingerstick as pt on comfort care      5.  History of traumatic brain injury and severe end-stage dementia per daughter report:  Limited quality of life at this point in time. Stop IV depakote and would not give orally due to risk of pill aspiration.      6.  Long-term goals:    Had previous discussions regarding feeding and long term cares    Family meeting 7/31 patient is now on comfort cares    The patient was seen, examined, and counseled on this day.      Total time spent in face to face contact with the patient and coordinating discharge was:  35 Minutes    Wei Rueda DO MPH  [facility]  [address] [place] [address]  Pager: [phone]  08/04/2017

## 2017-08-04 NOTE — PROGRESS NOTES
"SPIRITUAL HEALTH SERVICES Progress Note  [facility] ICU    Responded to Jia Miranda Palliative Care Nurse's request for emotional support for pt's daughter Marcio as pt Daniel is discharging to her home on hospice.  Marcio was at the bedside; Daniel slept throughout most of the visit.    Marcio processed her frustrations about her mother. Her parents are , but they have continued to have a connection; however, Marcio's mother has been manipulative and making inappropriate demands.  Marcio has made some boundaries with her and asked her mother's sister to communicate information about Daniel's care to her mother.    Marcio reported that her siblings and children will take shifts in caring for Daniel at home.  She has asked the  from Daniel's home [place] in [place] to visit him Vipul evening.     Marcio reflected that Daniel has had \"moments of clarity\" wherein he has prayed or had a conversation with a family member; at the same time, she senses that \"he is in both worlds\" talking to  loved ones as well as those physically present.    No further plans as pt's is discharging soon; reviewed with Marcio that  support is available through [facility]osp[facility].    Hunter Angulo M.Div., [facility]  Staff   Pager [phone]    "

## 2017-08-04 NOTE — PLAN OF CARE
Problem: Goal Outcome Summary  Goal: Goal Outcome Summary  Outcome: Adequate for Discharge Date Met:  08/04/17  ICU End of Shift Summary.  For vital signs and complete assessments, please see documentation flowsheets.      Pertinent assessments: Confused. Does not respond to questions appropriately. Denies pain. Having soft incontinent stools. Brief in place. Pt ate 75% of breakfast.   Major Shift Events: Prepared pt for discharge. No I.V in place. Went over discharge instructions and medications with pt's daughter. Ativan will be delivered to pt's home. All questions answered.   Plan (Upcoming Events): Comfort cares at home with daughter  Discharge/Transfer Needs: 11:15 pt discharged home via ambulance.     Bedside Shift Report Completed :   Bedside Safety Check Completed: